# Patient Record
Sex: FEMALE | Race: WHITE | NOT HISPANIC OR LATINO | Employment: FULL TIME | URBAN - METROPOLITAN AREA
[De-identification: names, ages, dates, MRNs, and addresses within clinical notes are randomized per-mention and may not be internally consistent; named-entity substitution may affect disease eponyms.]

---

## 2023-04-03 PROBLEM — E66.01 MORBID OBESITY WITH BMI OF 45.0-49.9, ADULT (MULTI): Status: ACTIVE | Noted: 2023-04-03

## 2023-04-03 PROBLEM — E78.5 HYPERLIPIDEMIA: Status: ACTIVE | Noted: 2023-04-03

## 2023-04-03 PROBLEM — D35.2 PROLACTINOMA (MULTI): Status: ACTIVE | Noted: 2023-04-03

## 2023-04-03 PROBLEM — G47.33 OBSTRUCTIVE SLEEP APNEA OF ADULT: Status: ACTIVE | Noted: 2023-04-03

## 2023-04-03 PROBLEM — L68.0 HIRSUTISM: Status: ACTIVE | Noted: 2023-04-03

## 2023-04-03 PROBLEM — R79.89 ELEVATED PROLACTIN LEVEL: Status: ACTIVE | Noted: 2023-04-03

## 2023-04-03 PROBLEM — F52.0 HYPOACTIVE SEXUAL DESIRE DISORDER: Status: ACTIVE | Noted: 2023-04-03

## 2023-04-03 PROBLEM — Z98.84 BARIATRIC SURGERY STATUS: Status: ACTIVE | Noted: 2023-04-03

## 2023-04-03 PROBLEM — F32.1 DEPRESSION, MAJOR, SINGLE EPISODE, MODERATE (MULTI): Status: ACTIVE | Noted: 2023-04-03

## 2023-04-03 PROBLEM — E88.810 DYSMETABOLIC SYNDROME X: Status: ACTIVE | Noted: 2023-04-03

## 2023-04-03 PROBLEM — G89.29 CHRONIC PAIN OF LEFT ANKLE: Status: ACTIVE | Noted: 2023-04-03

## 2023-04-03 PROBLEM — R61 HYPERHIDROSIS: Status: ACTIVE | Noted: 2023-04-03

## 2023-04-03 PROBLEM — H52.13 BILATERAL MYOPIA: Status: ACTIVE | Noted: 2023-04-03

## 2023-04-03 PROBLEM — E28.2 POLYCYSTIC OVARIAN SYNDROME: Status: ACTIVE | Noted: 2023-04-03

## 2023-04-03 PROBLEM — T81.89XA PROBLEM INVOLVING SURGICAL INCISION: Status: ACTIVE | Noted: 2023-04-03

## 2023-04-03 PROBLEM — E22.1 HYPERPROLACTINEMIA (MULTI): Status: ACTIVE | Noted: 2023-04-03

## 2023-04-03 PROBLEM — N64.3 GALACTORRHEA: Status: ACTIVE | Noted: 2023-04-03

## 2023-04-03 PROBLEM — D50.9 IRON DEFICIENCY ANEMIA: Status: ACTIVE | Noted: 2023-04-03

## 2023-04-03 PROBLEM — M25.572 CHRONIC PAIN OF LEFT ANKLE: Status: ACTIVE | Noted: 2023-04-03

## 2023-04-03 PROBLEM — H52.203 ASTIGMATISM, BILATERAL: Status: ACTIVE | Noted: 2023-04-03

## 2023-04-03 PROBLEM — F41.9 ANXIETY: Status: ACTIVE | Noted: 2023-04-03

## 2023-04-03 PROBLEM — E23.6 PITUITARY MASS (MULTI): Status: ACTIVE | Noted: 2023-04-03

## 2023-04-03 PROBLEM — R19.00 REDUCIBLE BULGE OF ABDOMINAL WALL: Status: ACTIVE | Noted: 2023-04-03

## 2023-04-03 PROBLEM — F54 PSYCHOLOGICAL FACTOR AFFECTING PHYSICAL CONDITION: Status: ACTIVE | Noted: 2023-04-03

## 2023-04-03 RX ORDER — CITALOPRAM 20 MG/1
TABLET, FILM COATED ORAL
COMMUNITY
Start: 2019-10-02 | End: 2023-10-20 | Stop reason: SDUPTHER

## 2023-04-03 RX ORDER — BUPROPION HYDROCHLORIDE 150 MG/1
1 TABLET ORAL DAILY
COMMUNITY
Start: 2022-09-22 | End: 2023-10-20 | Stop reason: ALTCHOICE

## 2023-04-03 RX ORDER — CITALOPRAM 10 MG/1
TABLET ORAL
COMMUNITY
Start: 2021-08-18 | End: 2023-10-20 | Stop reason: SDUPTHER

## 2023-04-03 RX ORDER — CHOLECALCIFEROL (VITAMIN D3) 25 MCG
25 TABLET ORAL DAILY
COMMUNITY

## 2023-04-03 RX ORDER — MULTIVITAMIN
TABLET ORAL
COMMUNITY

## 2023-04-03 NOTE — PROGRESS NOTES
Emerald Marie is a 42 y.o. female who presents today for  c/o RIGHT SIDED BACK PAIN     Chief Complaint   Patient presents with    Follow-up    Back Pain     Emerald has c/o right sided mid back pain that is radiating around to her right side and right abdomen [id number].        Symptoms THROBBING pain on RIGHT side in the back      Symptom onset has been acute for a time period of 10 day(s).       Severity is described as moderate.       Course of her symptoms over time is worsening.    Has taken: ADVIL/TYLENOL     Pt denies fever/chills, N/V  Pt denies urinary symptoms      Review of Systems  All 13 systems were reviewed and are within normal limits except positive and pertinent negative responses which are noted below or in HPI.      Objective   Vitals:  /78   Pulse (!) 111   Wt 127 kg (280 lb)   BMI 48.06 kg/m²         Physical Exam  Vitals reviewed.   Pulmonary:      Effort: Pulmonary effort is normal.   Abdominal:      General: Abdomen is flat. Bowel sounds are normal.   Musculoskeletal:         General: Tenderness present.      Comments: RIGHT SIDED CVA TTP    Neurological:      Mental Status: She is alert.         Assessment/Plan   Problem List Items Addressed This Visit    None  Visit Diagnoses       Acute right-sided thoracic back pain    -  Primary    Relevant Orders    POCT UA Automated manually resulted

## 2023-04-04 ENCOUNTER — OFFICE VISIT (OUTPATIENT)
Dept: PRIMARY CARE | Facility: CLINIC | Age: 43
End: 2023-04-04
Payer: COMMERCIAL

## 2023-04-04 VITALS
WEIGHT: 280 LBS | HEART RATE: 111 BPM | DIASTOLIC BLOOD PRESSURE: 78 MMHG | SYSTOLIC BLOOD PRESSURE: 136 MMHG | BODY MASS INDEX: 48.06 KG/M2

## 2023-04-04 DIAGNOSIS — M54.6 ACUTE RIGHT-SIDED THORACIC BACK PAIN: Primary | ICD-10-CM

## 2023-04-04 LAB
POC APPEARANCE, URINE: CLEAR
POC BILIRUBIN, URINE: NEGATIVE
POC BLOOD, URINE: NEGATIVE
POC COLOR, URINE: YELLOW
POC GLUCOSE, URINE: NEGATIVE MG/DL
POC KETONES, URINE: NEGATIVE MG/DL
POC LEUKOCYTES, URINE: NEGATIVE
POC NITRITE,URINE: NEGATIVE
POC PH, URINE: 7 PH
POC PROTEIN, URINE: NEGATIVE MG/DL
POC SPECIFIC GRAVITY, URINE: 1.01
POC UROBILINOGEN, URINE: 0.2 EU/DL

## 2023-04-04 PROCEDURE — 81003 URINALYSIS AUTO W/O SCOPE: CPT | Performed by: NURSE PRACTITIONER

## 2023-04-04 PROCEDURE — 99213 OFFICE O/P EST LOW 20 MIN: CPT | Performed by: NURSE PRACTITIONER

## 2023-04-04 RX ORDER — NAPROXEN 500 MG/1
500 TABLET ORAL 2 TIMES DAILY PRN
Qty: 60 TABLET | Refills: 0 | Status: SHIPPED | OUTPATIENT
Start: 2023-04-04 | End: 2023-07-03

## 2023-04-04 NOTE — PATIENT INSTRUCTIONS
Thank you for seeing me today.  It was a pleasure to see you again!    #RIGHT SIDED THORACIC BACK PAIN  IO UA----> NEGATIVE  Rx Naproxen as needed   Ice 2-3 times/day for pain  Stretching     Call if symptoms worsen

## 2023-07-21 ENCOUNTER — OFFICE VISIT (OUTPATIENT)
Dept: PRIMARY CARE | Facility: CLINIC | Age: 43
End: 2023-07-21
Payer: COMMERCIAL

## 2023-07-21 ENCOUNTER — LAB (OUTPATIENT)
Dept: LAB | Facility: LAB | Age: 43
End: 2023-07-21
Payer: COMMERCIAL

## 2023-07-21 VITALS
WEIGHT: 278 LBS | HEART RATE: 92 BPM | HEIGHT: 64 IN | DIASTOLIC BLOOD PRESSURE: 79 MMHG | BODY MASS INDEX: 47.46 KG/M2 | SYSTOLIC BLOOD PRESSURE: 121 MMHG

## 2023-07-21 DIAGNOSIS — R42 DIZZINESS: Primary | ICD-10-CM

## 2023-07-21 DIAGNOSIS — R42 DIZZINESS: ICD-10-CM

## 2023-07-21 DIAGNOSIS — R73.01 ELEVATED FASTING GLUCOSE: ICD-10-CM

## 2023-07-21 DIAGNOSIS — R73.03 PREDIABETES: ICD-10-CM

## 2023-07-21 LAB
ALANINE AMINOTRANSFERASE (SGPT) (U/L) IN SER/PLAS: 55 U/L (ref 7–45)
ALBUMIN (G/DL) IN SER/PLAS: 4.5 G/DL (ref 3.4–5)
ALKALINE PHOSPHATASE (U/L) IN SER/PLAS: 55 U/L (ref 33–110)
ANION GAP IN SER/PLAS: 13 MMOL/L (ref 10–20)
ASPARTATE AMINOTRANSFERASE (SGOT) (U/L) IN SER/PLAS: 26 U/L (ref 9–39)
BILIRUBIN TOTAL (MG/DL) IN SER/PLAS: 0.4 MG/DL (ref 0–1.2)
CALCIUM (MG/DL) IN SER/PLAS: 10.1 MG/DL (ref 8.6–10.3)
CARBON DIOXIDE, TOTAL (MMOL/L) IN SER/PLAS: 29 MMOL/L (ref 21–32)
CHLORIDE (MMOL/L) IN SER/PLAS: 100 MMOL/L (ref 98–107)
CREATININE (MG/DL) IN SER/PLAS: 0.88 MG/DL (ref 0.5–1.05)
GFR FEMALE: 84 ML/MIN/1.73M2
GLUCOSE (MG/DL) IN SER/PLAS: 120 MG/DL (ref 74–99)
MAGNESIUM (MG/DL) IN SER/PLAS: 2.02 MG/DL (ref 1.6–2.4)
POC HEMOGLOBIN A1C: 5.9 % (ref 4.2–6.5)
POTASSIUM (MMOL/L) IN SER/PLAS: 4.3 MMOL/L (ref 3.5–5.3)
PROTEIN TOTAL: 7.1 G/DL (ref 6.4–8.2)
SODIUM (MMOL/L) IN SER/PLAS: 138 MMOL/L (ref 136–145)
UREA NITROGEN (MG/DL) IN SER/PLAS: 13 MG/DL (ref 6–23)

## 2023-07-21 PROCEDURE — 80053 COMPREHEN METABOLIC PANEL: CPT

## 2023-07-21 PROCEDURE — 83735 ASSAY OF MAGNESIUM: CPT

## 2023-07-21 PROCEDURE — 83036 HEMOGLOBIN GLYCOSYLATED A1C: CPT | Performed by: NURSE PRACTITIONER

## 2023-07-21 PROCEDURE — 99213 OFFICE O/P EST LOW 20 MIN: CPT | Performed by: NURSE PRACTITIONER

## 2023-07-21 PROCEDURE — 36415 COLL VENOUS BLD VENIPUNCTURE: CPT

## 2023-07-21 RX ORDER — MECLIZINE HYDROCHLORIDE 25 MG/1
25 TABLET ORAL 3 TIMES DAILY PRN
Qty: 30 TABLET | Refills: 1 | Status: SHIPPED | OUTPATIENT
Start: 2023-07-21 | End: 2023-07-21

## 2023-07-21 NOTE — PROGRESS NOTES
"Emerald Marie is a 43 y.o. female who presents today for a sick visit    Chief Complaint   Patient presents with    Follow-up     Emerald is here today for \"dizzy spells\" over the past few weeks.        Symptoms DIZZINESS    Pt states \"feels like I am spinning\"  Pt also states @ 3 weeks ago she was sitting in a chair at work and it felt like everything drained from her body  Pt states this has been an issue for her for @ 8 years  She has seen ENT and had testing for BPPV and Meniere Ds and both were negative     Pt had elevated FBG in Oct, will check A1C today    Orthostatic BP negative     Pt states her Apple watch will occasionally read HR \"40\",   Pt does not check her pulse to confirm this     Review of Systems  All 13 systems were reviewed and are within normal limits except positive and pertinent negative responses which are noted below or in HPI.      Objective   Vitals:  /79 (Patient Position: Standing)   Pulse 92   Ht 1.626 m (5' 4\")   Wt 126 kg (278 lb)   BMI 47.72 kg/m²         Physical Exam  Vitals reviewed.   Constitutional:       Appearance: She is obese.   Cardiovascular:      Pulses: Normal pulses.      Heart sounds: No murmur heard.  Pulmonary:      Effort: Pulmonary effort is normal.         Assessment/Plan   Problem List Items Addressed This Visit    None  Visit Diagnoses       Dizziness    -  Primary    Relevant Medications    meclizine (Antivert) 25 mg tablet    Other Relevant Orders    Comprehensive Metabolic Panel    Magnesium    Holter or Event Cardiac Monitor    Elevated fasting glucose        Relevant Orders    POCT glycosylated hemoglobin (Hb A1C) manually resulted (Completed)    Prediabetes                  "

## 2023-07-21 NOTE — PATIENT INSTRUCTIONS
Thank you for seeing me today.  It was a pleasure to see you again!    Your A1C was 5.9%, this is Pre-diabetes  Watch sugars/carbohydrates   Weight loss encouraged    Wear holter monitor     RTC AS NEEDED

## 2023-09-01 ENCOUNTER — TELEPHONE (OUTPATIENT)
Dept: PRIMARY CARE | Facility: CLINIC | Age: 43
End: 2023-09-01
Payer: COMMERCIAL

## 2023-09-01 NOTE — TELEPHONE ENCOUNTER
"Spoke with pt regarding holter monitor results.  Normal study   Pt states she will still feel \"dizzy\" at times, no consistency noted  Is watching sugars/carbs d/t prediabetes  No meds at this time  Has f/u in Oct to recheck A1c    "

## 2023-10-19 NOTE — PROGRESS NOTES
"Subjective   Chief Complaint   Patient presents with    Follow-up     EMERALD IS HERE TODAY FOR ROUTINE 3 MONTH F/U AND A1C CHECK. PT WOULD LIKE TO GET HER FLU SHOT TODAY ALSO.        Patient ID: Emerald Marie is a 43 y.o. female who presents for Follow-up (EMERALD IS HERE TODAY FOR ROUTINE 3 MONTH F/U AND A1C CHECK. PT WOULD LIKE TO GET HER FLU SHOT TODAY ALSO. ).    HPI  Est 42 yo female presents for 3 mo f/u on PREDIABETES     Pt reports still experiencing intermittent dizziness  Last occurrence was 2 weeks ago while cleaning the house  Denies syncope  Denies headaches  Pt has done holter monitor---> NEGATIVE  Pt has seen ENT---> NEGATIVE W/U     A1C= 5.9% July 2023  No medications    Review of Systems  All 13 systems were reviewed and are within normal limits except positive and pertinent negative responses which are noted below or in HPI.      Objective   /80   Pulse 90   Ht 1.626 m (5' 4\")   Wt 125 kg (276 lb)   BMI 47.38 kg/m²        Physical Exam  Vitals reviewed.   Cardiovascular:      Pulses: Normal pulses.      Heart sounds: Normal heart sounds.   Pulmonary:      Effort: Pulmonary effort is normal.      Breath sounds: Normal breath sounds.   Skin:     General: Skin is warm and dry.   Neurological:      Mental Status: She is alert.   Psychiatric:         Mood and Affect: Mood normal.         Assessment/Plan   Problem List Items Addressed This Visit             ICD-10-CM    Depression, major, single episode, moderate (CMS/HCC) F32.1    Relevant Medications    citalopram (CeleXA) 10 mg tablet    citalopram (CeleXA) 20 mg tablet    Hyperlipidemia E78.5    Relevant Orders    Lipid Panel    Hyperprolactinemia (CMS/HCC) E22.1    Iron deficiency anemia D50.9    Relevant Orders    CBC    Vitamin D 25-Hydroxy,Total (for eval of Vitamin D levels)    Morbid obesity with BMI of 45.0-49.9, adult (CMS/HCC) E66.01, Z68.42    Relevant Orders    Comprehensive Metabolic Panel     Other Visit Diagnoses         " Codes    Breast screening    -  Primary Z12.39    Relevant Orders    BI mammo bilateral screening tomosynthesis    Prolactin secreting pituitary adenoma (CMS/HCC)     D35.2    Relevant Orders    Prolactin level    Dizziness     R42    Relevant Orders    Referral to Neurology    Encounter for immunization     Z23    Relevant Orders    Flu vaccine (IIV4) age 6 months and greater, preservative free (Completed)    Need for vaccination     Z23    Prediabetes     R73.03    Relevant Orders    POCT glycosylated hemoglobin (Hb A1C) manually resulted (Completed)

## 2023-10-20 ENCOUNTER — OFFICE VISIT (OUTPATIENT)
Dept: PRIMARY CARE | Facility: CLINIC | Age: 43
End: 2023-10-20
Payer: COMMERCIAL

## 2023-10-20 ENCOUNTER — PHARMACY VISIT (OUTPATIENT)
Dept: PHARMACY | Facility: CLINIC | Age: 43
End: 2023-10-20
Payer: COMMERCIAL

## 2023-10-20 VITALS
WEIGHT: 276 LBS | BODY MASS INDEX: 47.12 KG/M2 | SYSTOLIC BLOOD PRESSURE: 136 MMHG | DIASTOLIC BLOOD PRESSURE: 80 MMHG | HEIGHT: 64 IN | HEART RATE: 90 BPM

## 2023-10-20 DIAGNOSIS — Z12.39 BREAST SCREENING: Primary | ICD-10-CM

## 2023-10-20 DIAGNOSIS — R42 DIZZINESS: ICD-10-CM

## 2023-10-20 DIAGNOSIS — E66.01 MORBID OBESITY WITH BMI OF 45.0-49.9, ADULT (MULTI): ICD-10-CM

## 2023-10-20 DIAGNOSIS — E22.1 HYPERPROLACTINEMIA (MULTI): ICD-10-CM

## 2023-10-20 DIAGNOSIS — D50.8 IRON DEFICIENCY ANEMIA SECONDARY TO INADEQUATE DIETARY IRON INTAKE: ICD-10-CM

## 2023-10-20 DIAGNOSIS — R73.03 PREDIABETES: ICD-10-CM

## 2023-10-20 DIAGNOSIS — Z23 NEED FOR VACCINATION: ICD-10-CM

## 2023-10-20 DIAGNOSIS — F32.1 DEPRESSION, MAJOR, SINGLE EPISODE, MODERATE (MULTI): ICD-10-CM

## 2023-10-20 DIAGNOSIS — E78.2 MIXED HYPERLIPIDEMIA: ICD-10-CM

## 2023-10-20 DIAGNOSIS — Z23 ENCOUNTER FOR IMMUNIZATION: ICD-10-CM

## 2023-10-20 DIAGNOSIS — D35.2 PROLACTIN SECRETING PITUITARY ADENOMA (MULTI): ICD-10-CM

## 2023-10-20 PROBLEM — L21.9 SEBORRHEIC DERMATITIS, UNSPECIFIED: Status: ACTIVE | Noted: 2021-08-06

## 2023-10-20 LAB — POC HEMOGLOBIN A1C: 5.9 % (ref 4.2–6.5)

## 2023-10-20 PROCEDURE — 90471 IMMUNIZATION ADMIN: CPT | Performed by: NURSE PRACTITIONER

## 2023-10-20 PROCEDURE — 90686 IIV4 VACC NO PRSV 0.5 ML IM: CPT | Performed by: NURSE PRACTITIONER

## 2023-10-20 PROCEDURE — 3008F BODY MASS INDEX DOCD: CPT | Performed by: NURSE PRACTITIONER

## 2023-10-20 PROCEDURE — 83036 HEMOGLOBIN GLYCOSYLATED A1C: CPT | Performed by: NURSE PRACTITIONER

## 2023-10-20 PROCEDURE — 99214 OFFICE O/P EST MOD 30 MIN: CPT | Performed by: NURSE PRACTITIONER

## 2023-10-20 RX ORDER — CITALOPRAM 10 MG/1
TABLET ORAL
Qty: 90 TABLET | Refills: 3 | Status: SHIPPED | OUTPATIENT
Start: 2023-10-20 | End: 2024-10-19

## 2023-10-20 RX ORDER — CITALOPRAM 20 MG/1
TABLET, FILM COATED ORAL
Qty: 90 TABLET | Refills: 3 | Status: SHIPPED | OUTPATIENT
Start: 2023-10-20 | End: 2024-10-19

## 2023-10-21 ENCOUNTER — LAB (OUTPATIENT)
Dept: LAB | Facility: LAB | Age: 43
End: 2023-10-21
Payer: COMMERCIAL

## 2023-10-21 DIAGNOSIS — E66.01 MORBID OBESITY WITH BMI OF 45.0-49.9, ADULT (MULTI): ICD-10-CM

## 2023-10-21 DIAGNOSIS — D50.8 IRON DEFICIENCY ANEMIA SECONDARY TO INADEQUATE DIETARY IRON INTAKE: ICD-10-CM

## 2023-10-21 DIAGNOSIS — D35.2 PROLACTIN SECRETING PITUITARY ADENOMA (MULTI): ICD-10-CM

## 2023-10-21 DIAGNOSIS — E78.2 MIXED HYPERLIPIDEMIA: ICD-10-CM

## 2023-10-21 LAB
25(OH)D3 SERPL-MCNC: 33 NG/ML (ref 30–100)
ALBUMIN SERPL BCP-MCNC: 4.4 G/DL (ref 3.4–5)
ALP SERPL-CCNC: 52 U/L (ref 33–110)
ALT SERPL W P-5'-P-CCNC: 29 U/L (ref 7–45)
ANION GAP SERPL CALC-SCNC: 13 MMOL/L (ref 10–20)
AST SERPL W P-5'-P-CCNC: 14 U/L (ref 9–39)
BILIRUB SERPL-MCNC: 0.5 MG/DL (ref 0–1.2)
BUN SERPL-MCNC: 13 MG/DL (ref 6–23)
CALCIUM SERPL-MCNC: 9.6 MG/DL (ref 8.6–10.6)
CHLORIDE SERPL-SCNC: 103 MMOL/L (ref 98–107)
CHOLEST SERPL-MCNC: 182 MG/DL (ref 0–199)
CHOLESTEROL/HDL RATIO: 4.7
CO2 SERPL-SCNC: 29 MMOL/L (ref 21–32)
CREAT SERPL-MCNC: 0.8 MG/DL (ref 0.5–1.05)
ERYTHROCYTE [DISTWIDTH] IN BLOOD BY AUTOMATED COUNT: 13.4 % (ref 11.5–14.5)
GFR SERPL CREATININE-BSD FRML MDRD: >90 ML/MIN/1.73M*2
GLUCOSE SERPL-MCNC: 103 MG/DL (ref 74–99)
HCT VFR BLD AUTO: 35.1 % (ref 36–46)
HDLC SERPL-MCNC: 39 MG/DL
HGB BLD-MCNC: 10.8 G/DL (ref 12–16)
LDLC SERPL CALC-MCNC: 106 MG/DL
MCH RBC QN AUTO: 26.4 PG (ref 26–34)
MCHC RBC AUTO-ENTMCNC: 30.8 G/DL (ref 32–36)
MCV RBC AUTO: 86 FL (ref 80–100)
NON HDL CHOLESTEROL: 143 MG/DL (ref 0–149)
NRBC BLD-RTO: 0 /100 WBCS (ref 0–0)
PLATELET # BLD AUTO: 285 X10*3/UL (ref 150–450)
PMV BLD AUTO: 10.8 FL (ref 7.5–11.5)
POTASSIUM SERPL-SCNC: 4.9 MMOL/L (ref 3.5–5.3)
PROLACTIN SERPL-MCNC: 20 UG/L (ref 3–20)
PROT SERPL-MCNC: 6.6 G/DL (ref 6.4–8.2)
RBC # BLD AUTO: 4.09 X10*6/UL (ref 4–5.2)
SODIUM SERPL-SCNC: 140 MMOL/L (ref 136–145)
TRIGL SERPL-MCNC: 183 MG/DL (ref 0–149)
VLDL: 37 MG/DL (ref 0–40)
WBC # BLD AUTO: 6.6 X10*3/UL (ref 4.4–11.3)

## 2023-10-21 PROCEDURE — 82306 VITAMIN D 25 HYDROXY: CPT

## 2023-10-21 PROCEDURE — 85027 COMPLETE CBC AUTOMATED: CPT

## 2023-10-21 PROCEDURE — 80053 COMPREHEN METABOLIC PANEL: CPT

## 2023-10-21 PROCEDURE — 80061 LIPID PANEL: CPT

## 2023-10-21 PROCEDURE — 84146 ASSAY OF PROLACTIN: CPT

## 2023-10-21 PROCEDURE — 36415 COLL VENOUS BLD VENIPUNCTURE: CPT

## 2023-10-27 ENCOUNTER — ANCILLARY PROCEDURE (OUTPATIENT)
Dept: RADIOLOGY | Facility: CLINIC | Age: 43
End: 2023-10-27
Payer: COMMERCIAL

## 2023-10-27 VITALS — HEIGHT: 64 IN | WEIGHT: 278 LBS | BODY MASS INDEX: 47.46 KG/M2

## 2023-10-27 DIAGNOSIS — Z12.39 BREAST SCREENING: ICD-10-CM

## 2023-10-27 PROCEDURE — 77067 SCR MAMMO BI INCL CAD: CPT

## 2023-11-07 ENCOUNTER — APPOINTMENT (OUTPATIENT)
Dept: PRIMARY CARE | Facility: CLINIC | Age: 43
End: 2023-11-07
Payer: COMMERCIAL

## 2023-11-07 PROCEDURE — 87086 URINE CULTURE/COLONY COUNT: CPT

## 2023-11-07 PROCEDURE — 87186 SC STD MICRODIL/AGAR DIL: CPT

## 2023-11-08 ENCOUNTER — LAB REQUISITION (OUTPATIENT)
Dept: LAB | Facility: HOSPITAL | Age: 43
End: 2023-11-08
Payer: COMMERCIAL

## 2023-11-08 DIAGNOSIS — N39.0 URINARY TRACT INFECTION, SITE NOT SPECIFIED: ICD-10-CM

## 2023-11-10 LAB — BACTERIA UR CULT: ABNORMAL

## 2024-01-04 ENCOUNTER — OFFICE VISIT (OUTPATIENT)
Dept: NEUROLOGY | Facility: CLINIC | Age: 44
End: 2024-01-04
Payer: COMMERCIAL

## 2024-01-04 VITALS
WEIGHT: 282 LBS | SYSTOLIC BLOOD PRESSURE: 134 MMHG | BODY MASS INDEX: 48.41 KG/M2 | DIASTOLIC BLOOD PRESSURE: 74 MMHG | HEART RATE: 86 BPM | RESPIRATION RATE: 19 BRPM

## 2024-01-04 DIAGNOSIS — G43.809 VESTIBULAR MIGRAINE: Primary | ICD-10-CM

## 2024-01-04 DIAGNOSIS — R42 DIZZINESS: ICD-10-CM

## 2024-01-04 PROCEDURE — 1036F TOBACCO NON-USER: CPT | Performed by: PSYCHIATRY & NEUROLOGY

## 2024-01-04 PROCEDURE — 99204 OFFICE O/P NEW MOD 45 MIN: CPT | Performed by: PSYCHIATRY & NEUROLOGY

## 2024-01-04 PROCEDURE — 99214 OFFICE O/P EST MOD 30 MIN: CPT | Performed by: PSYCHIATRY & NEUROLOGY

## 2024-01-04 PROCEDURE — RXMED WILLOW AMBULATORY MEDICATION CHARGE

## 2024-01-04 PROCEDURE — 3008F BODY MASS INDEX DOCD: CPT | Performed by: PSYCHIATRY & NEUROLOGY

## 2024-01-04 RX ORDER — TOPIRAMATE 25 MG/1
TABLET ORAL
Qty: 120 TABLET | Refills: 1 | Status: SHIPPED | OUTPATIENT
Start: 2024-01-04 | End: 2024-04-04 | Stop reason: SDUPTHER

## 2024-01-04 ASSESSMENT — PAIN SCALES - GENERAL: PAINLEVEL: 0-NO PAIN

## 2024-01-04 NOTE — PROGRESS NOTES
HPI:   Consultation on this 43 year old woman was requested regarding dizziness.  The patient was alone.    Previous records (physician notes, land radiology reports) and imaging studies were reviewed and summarized.      Had brain MRI in 2020: improvement in size of known prolactinoma  For the prolactinoma and she follows with PCP and gets regular labs, used to follow with endocrine. Not on therapy as blood work is stable.  Here b/o 10 years of intemittent dizziness, 1-2 times a month, can last for 2-3 days, things are not moving but feels like a sense of spinning forward with eyes closed. With eyes open there is more a feeling of visual discomfort along with some trouble with balance but not falls. During most of these episodes she has to stay in bed, sometimes gets nausea, sometimes with photophobia. No major headaches, last one 2 weeks ago with pain that started behind the left eye then involved the whole forehead, usually feels like pressure tension 4/10 at the most, will take tyelnol which helps, can last for a few hours.   Denies syncope.  Pt has had holter monitor and ENT evaluation in the past, which were unrevealing.       The patient denies cognitive symptoms (e.g., trouble with memory, concentration, thinking speed, multitasking), mood changes (e.g., anxiety, depression), diplopia, bulbar symptoms, weakness, spasticity, sensory loss, dysesthesias, paresthesias, incoordination, difficulty with gait, bladder and bowel symptoms.     10-point review of systems was negative except as mentioned in the HPI and/or in the scanned Department of Neurology health assessment form.     FMH:  Grandmother has vertigo    Past Medical History:   Diagnosis Date    Allergy status to unspecified drugs, medicaments and biological substances     History of seasonal allergies    Anemia     Depression     Encounter for gynecological examination (general) (routine) without abnormal findings 09/08/2020    Encounter for annual  routine gynecological examination    Encounter for gynecological examination (general) (routine) without abnormal findings 10/15/2019    Encounter for annual routine gynecological examination    Encounter for gynecological examination (general) (routine) without abnormal findings 04/26/2016    Encounter for annual routine gynecological examination    Encounter for other general counseling and advice on contraception 03/27/2017    Counseling for birth control, oral contraceptives    Encounter for other preprocedural examination 09/03/2020    Preoperative clearance    Encounter for other specified surgical aftercare 02/22/2021    Postoperative observation    Encounter for screening for nutritional disorder     Encounter for vitamin deficiency screening    Encounter for screening for nutritional disorder 08/19/2020    Encounter for special screening examination for nutritional disorder    Infection following a procedure, superficial incisional surgical site, initial encounter 01/25/2021    Superficial incisional infection of surgical site    Obesity, unspecified 09/03/2020    Obesity (BMI 30-39.9)    Other prurigo 04/23/2021    Pruritic rash    Other specified disorders of nose and nasal sinuses 03/06/2020    Sinus pressure    Personal history of diseases of the blood and blood-forming organs and certain disorders involving the immune mechanism     History of anemia    Personal history of diseases of the skin and subcutaneous tissue 12/04/2018    History of pityriasis rosea    Personal history of other benign neoplasm 10/13/2020    History of uterine leiomyoma    Personal history of other diseases of the female genital tract 11/13/2020    History of abnormal uterine bleeding    Personal history of other diseases of the female genital tract 02/05/2020    History of polycystic ovarian syndrome    Personal history of other diseases of the respiratory system 02/21/2015    History of acute sinusitis    Personal history of  other specified conditions 06/10/2020    History of dizziness    Personal history of other specified conditions 2013    History of nipple discharge    Persons encountering health services in other specified circumstances 2020    Encounter to establish care    Polyp of cervix uteri 2020    Cervical polyp    Radiculopathy, lumbar region 2016    Acute lumbar radiculopathy    Right lower quadrant pain 10/15/2019    Abdominal pain, RLQ (right lower quadrant)    Snoring     Snoring      Allergies   Allergen Reactions    Azithromycin Rash    Clarithromycin Rash      Social History     Tobacco Use    Smoking status: Former     Packs/day: 1.00     Years: 15.00     Additional pack years: 0.00     Total pack years: 15.00     Types: Cigarettes     Quit date: 3/20/2023     Years since quittin.7     Passive exposure: Past    Smokeless tobacco: Never   Vaping Use    Vaping Use: Never used   Substance Use Topics    Alcohol use: Yes     Comment: 2-3 drinks a month    Drug use: Never        Current Outpatient Medications:     cholecalciferol (Vitamin D-3) 25 MCG (1000 UT) tablet, Take 1 tablet (25 mcg) by mouth once daily., Disp: , Rfl:     citalopram (CeleXA) 10 mg tablet, TAKE 1 TABLET BY MOUTH ONE TIME DAILY WITH CITALOPRAM 20MG. TOTAL DOSE OF 30MG., Disp: 90 tablet, Rfl: 3    citalopram (CeleXA) 20 mg tablet, TAKE 1 TABLET BY MOUTH ONE TIME DAILY WITH CITALOPRAM 10MG TABLET. TOTAL DOSE OF 30MG., Disp: 90 tablet, Rfl: 3    ferrous sulfate (IRON ORAL), , Disp: , Rfl:     MAGNESIUM ORAL, , Disp: , Rfl:     meclizine (Antivert) 25 mg tablet, TAKE 1 TABLET BY MOUTH THREE TIMES A DAY AS NEEDED FOR DIZZINESS, Disp: 30 tablet, Rfl: 1    multivitamin tablet, Multi Vitamin Daily TABS  Refills: 0     Active, Disp: , Rfl:     ZINC ORAL, , Disp: , Rfl:      Exam:  Normal general appearance.     The patient was alert and oriented to person, place, and time with normal language, attention and concentration, recent  and remote memory. Normal fund of knowledge.     Visual fields were full to confrontation.  Pupils were 6 mm and briskly reactive OU without a relative afferent pupillary defect.  Funduscopic examination was grossly normal without disc edema, erythema, or atrophy.  Eye movements: normal fixation, no spontaneous or gaze-evoked nystagmus, normal speed and amplitude of horizontal and vertical saccades, no saccadic dysmetria, normal smooth pursuit  Facial sensation to light touch and pinprick was normal.  Muscles of mastication and facial expression moved normally.  Hearing was normal.  Gag reflex and palatal movements were normal.  Sternocleidomastoid and trapezius power were normal.  Tongue movements were normal.  There was no dysarthria.    There was no pronator drift, no orbiting. Normal bulk and tone.    Muscle Strenght (#/5):  Upper extremity    Deltoids Right 5 Left 5  Biceps Right 5 Left 5  Triceps Right 5 Left 5   Right 5 Left 5  ROSSANA Right 5 Left 5  Lower extremity    Iliopsoas Right 5 Left 5  Quadriceps Right 5 Left 5  Hamstrings Right 5 Left 5  Tibialis ant Right 5 Left 5  Gastrocn Right 5 Left 5    Reflexes:  Upper extremity    Biceps Right 2+ Left 2+  Triceps Right 2+ Left 2+  Brachiorad Right 2+ Left 2+  Lower extremity    Patellar Right 1+ Left 2+  Achilles Right 2+ Left 2+  Plant Cut Right Down Left Down    Coordination in the arms and legs was intact including point-to-point, rapid-alternating, and fine movements.    Light touch, pinprick, and proprioception were normal.  Romberg's test was normal.    Standard gait was normal.      A/P:  42 yo woman with hx of prolactinoma, not on therapy, here for a decade of intermittent episodes of dizziness, that even without major episodes of headaches, do even some features suggestive of migraines. She had work up to r/o other etiologies. I think it is reasonable to empirically try topiramate, whose possible SE I have discussed. She has my contact info, will  see her back in 3 months.

## 2024-01-05 ENCOUNTER — PHARMACY VISIT (OUTPATIENT)
Dept: PHARMACY | Facility: CLINIC | Age: 44
End: 2024-01-05
Payer: COMMERCIAL

## 2024-01-23 ENCOUNTER — TELEMEDICINE (OUTPATIENT)
Dept: BEHAVIORAL HEALTH | Facility: CLINIC | Age: 44
End: 2024-01-23
Payer: COMMERCIAL

## 2024-01-23 DIAGNOSIS — F54 PSYCHOLOGICAL FACTOR AFFECTING PHYSICAL CONDITION: ICD-10-CM

## 2024-01-23 DIAGNOSIS — F33.0 MILD EPISODE OF RECURRENT MAJOR DEPRESSIVE DISORDER (CMS-HCC): Primary | ICD-10-CM

## 2024-01-23 PROCEDURE — 90791 PSYCH DIAGNOSTIC EVALUATION: CPT | Performed by: PSYCHOLOGIST

## 2024-01-23 NOTE — PROGRESS NOTES
Outpatient Psychology Initial Evaluation    IDENTIFYING AND REFERRAL INFORMATION:  Emerald Marie is a 43 y.o. yo able-bodied  heterosexual White female patient referred by her PCP, GAIL Johnson CNP.     Start Time: 4 pm  End Time: 5 pm  Time Spent with Patient: 52 minutes    Session number 1 with this psychologist.    This is a virtual video visit.    Telephone/Televideo Informed Consent for Psychotherapy was reviewed with the patient as follows:  There are potential benefits and risks of the use of telephone or video-conferencing that differ from in-person sessions. Specifically, the telephone or televideo system we are using may not be HIPPA compliant and may present limits to patient confidentiality. Confidentiality still applies for telepsychology services, and nobody will record the session without your permission. You agree to use the telephone or video-conferencing platform selected for our virtual sessions, and I will explain how to use it.    1)             You need to use a webcam or smartphone during the session.  2)             It is important that you be in a quiet, private space that is free of distractions (including cell phone or other devices) during the session.  3)             It is important to use a secure internet connection rather than public/free Wi-Fi.  4)             It is important to be on time. If you need to cancel or change your tele-appointment, you must notify the psychologist in advance by phone or email.  5)             We need a back-up plan (e.g., phone number where you can be reached) to restart the session or to reschedule it, in the event of technical problems.  6)             We need a safety plan that includes at least one emergency contact and the closest emergency room to your location, in the event of a crisis situation.  7)             If you are not an adult, we need the permission of your parent or legal guardian (and their contact information) for you to  "participate in telepsychology sessions.    Understanding and verbal agreement was attested to by the patient.    The purpose of the meeting is for provider to understand patient's presenting problems, mental health hx, and other background information to assist with treatment planning. Patient stated an understanding of the information and agreed to the interview.    SECURE NOTE:  This document may not be released or reproduced in any form without the consent of either the Provider, the Provider´s  or the Chair of the Department of Psychiatry. This prohibition includes copying the document into any non-Restricted area of the Ambulatory Electronic Medical Record.      REASON  FOR REFERRAL:  Recent symptoms of depression, anxiety, history of suicidal thoughts (no plan or intent per patient). The holiday season she noticed more symptoms, because of end of year stressors not specific to the holidays. A lot of work demands, fight with . Responded with self-isolation and increased suicidal ideation.     SOCIAL AND FAMILY HISTORY:   almost 10 years. No children, intentional. Two sisters who are supports. Parents are  and still living, she is close with them as well. They all live within 10 minutes of each other. She has nieces and nephews and one great nephew. She is close with them, they do family activities and invites nieces/nephews over to her house. Her family moved to [place] when she was little, all other extended family lives out of state. \"We're it here.\" Couple of close friends, best friend lives out of state. Brother in law, small group of couples friends, a few girlfriends. Get together occasionally.   She and her  have a boat and kayaks, like to be out on the water. Enjoys beach vacations, seeing lighthouses around the country. Gambling, reading are also enjoyment.      employee,  and billing. Work has been busier since transition to " "[employer].    CULTURAL CONSIDERATIONS:  Congregational, no [employer] attendance. Believes in God. Moved to [place] as a kid, immediate family all lives within 10 minute drive of each other. Childless by choice.     HISTORY OF MENTAL HEALTH SYMPTOMS AND TREATMENT:  When felt most overwhelmed, was having trouble concentrating, experiencing depressed mood, hopelessness, some suicidal ideation with plan (not intent), anhedonia, fatigue, rumination, insomnia (difficulty falling asleep). Increased Celexa dose and symptoms improved. SI stopped, mood improved to depressed mood half the time, insomnia still going, anhedonia and energy improved, but more to go. \"It's getting there.\"     Has never sought mental health treatment, other than PCP prescription. No history of psych hospitalization.     SUBSTANCE USE:   Alcohol use occasional, couple times a month. A couple drinks when out.   Marijuana edibles on weekends.   No tobacco use, no illicit drug use.     RELEVANT MED Hx AND MEDICATIONS:  Taking Celexa 30 mg, prescribed by PCP. Almost 2 years on the medication.  PCOS, makes weight management challenging. Chronic dizziness/vestibular issues. On a new medication to see if it will help.     OBJECTIVE:  Mental Status:  Orientation and consciousness: [x ]oriented X 3 and attentive                                   [ ]other, explain:       Appearance/grooming (factors observable via video):            [x ]appropriate  [ ]poor grooming/hygiene bizarre appearance            [ ]other, explain:        Behavior: [x ]appropriate to context                 [ ]inappropriate and/or bizarre, explain:        Speech: [x]normal rate/rhythm   [ ]pressured speech   []slow                [ ]other,        Language: [x ]normal                  [ ]abnormalities noted, explain:        Mood: [ ]euthymic [ x]depressed [ ]dysphoric [ ]anxious      [ ]euphoric  [ ] other       Affect: [x]mood congruent      []restricted               []incongruent, explain:        " Evidence of perceptual disturbances (hallucinations, illusions, etc.):                [ x]no                [ ]yes, explain:       Thought processes:                     [x ]normal and congruent                     [ ]other, explain:        Insight: [ ]good  [x ]adequate [ ]poor []questionable       Judgment: [ ]good  [x ]adequate [ ]poor []questionable       Fund of Knowledge:[ ]above average  [x ]average  [ ]below average    Suicidal/Homicidal assessment: No lethality issues noted or observed. Patient denied suicidal or homicidal ideation, intent, or plan. She endorsed recent suicidal ideation with consideration of plan, no specific plan, no preparatory behaviors, and not intent to harm. This was 3-4 weeks ago. She denied any recurrence since then and reported symptoms have improved.   A. Risk Factors:   YES  NO   [  x] [  ] Significant psychosocial stressors   [  ] [  x] Current thoughts of suicide or self-harm; homicide  [  ] [  x] Previous suicide attempts   [  ] [ x ] Substance abuse  [  x] [  ] Sleep disturbance   [  ] [  x] Access to weapons- they own firearms.  moved them to a location she does not know. They are secured.    B. Protective Factors:  YES  NO   [ x ] [  ] Has social supports  [ x ] [  ] Life satisfaction/stated reasons for living  [ x ] [  ] Taking medications as prescribed   [ x ] [  ] Positive coping/problem solving skills: Positive help-seeking  [ x ] [  ] Sense of responsibility/attachment to others    C. Overall Impression:  RISK LEVEL   [ x ]   Low imminent and long-term risk of harm to others. Low imminent risk of harm to self at this time. Risk is dynamic and can change.   [  ]   Moderate long-term risk of harm to self given history of XXX in   combination with other risk factors.  [  ]   High      SOCIAL DETERMINANTS OF HEALTH:  None noted.      INITIAL IMPRESSIONS:  Emerald Marie presented today via video telehealth for a psychosocial assessment. She reported recurrent  depressive symptoms exacerbated by situational stressors, for which she is taking Celexa prescribed by her PCP. She endorsed current symptoms of depressed mood about half the days, anhedonia, insomnia, periodic hopelessness, rumination, self-blame, self-isolative behaviors. She endorsed past suicidal ideation about a month ago, without intent. This was in context of daily depressed mood, hopelessness, and anhedonia which has since improved to the current reported symptom severity and frequency. She has never sought psychotherapy before. She feels this is a good timing, considering her chronic stressors and coping resources that are no longer enough for her needs. She agreed to follow-up with this psychologist for individual psychotherapy and requested telehealth modality.      Diagnosis: major depressive disorder, recurrent, mild, current episode    Plan:   rtc in about 2 weeks for individual psychotherapy with this psychologist via video telehealth. Patient agreed to appointment frequency and plan. Patient has scheduling contact info to use as needed. Order placed and scheduling provider alerted.     Continue Celexa as prescribed; report concerns to prescribing provider if they occur.       Alexis Huff, PhD

## 2024-02-09 ENCOUNTER — PHARMACY VISIT (OUTPATIENT)
Dept: PHARMACY | Facility: CLINIC | Age: 44
End: 2024-02-09
Payer: COMMERCIAL

## 2024-02-09 PROCEDURE — RXMED WILLOW AMBULATORY MEDICATION CHARGE

## 2024-02-13 ENCOUNTER — TELEMEDICINE (OUTPATIENT)
Dept: BEHAVIORAL HEALTH | Facility: CLINIC | Age: 44
End: 2024-02-13
Payer: COMMERCIAL

## 2024-02-13 DIAGNOSIS — F33.0 MILD EPISODE OF RECURRENT MAJOR DEPRESSIVE DISORDER (CMS-HCC): ICD-10-CM

## 2024-02-13 PROCEDURE — 90837 PSYTX W PT 60 MINUTES: CPT | Performed by: PSYCHOLOGIST

## 2024-02-13 NOTE — PROGRESS NOTES
Behavioral Medicine Psychotherapy Progress Note      IDENTIFYING AND REFERRAL INFORMATION:  Emerald Marie is a 43 y.o. able-bodied  employed White female patient who presented for follow-up.     Start Time: 4 pm  End Time: 5 pm  Time Spent with Patient: 54 minutes    Session number 2 with this psychologist.    This is a virtual video visit.    Telephone/Televideo Informed Consent for Psychotherapy was reviewed with the patient as follows:  There are potential benefits and risks of the use of telephone or video-conferencing that differ from in-person sessions. Specifically, the telephone or televideo system we are using may not be HIPPA compliant and may present limits to patient confidentiality. Confidentiality still applies for telepsychology services, and nobody will record the session without your permission. You agree to use the telephone or video-conferencing platform selected for our virtual sessions, and I will explain how to use it.    1)             You need to use a webcam or smartphone during the session.  2)             It is important that you be in a quiet, private space that is free of distractions (including cell phone or other devices) during the session.  3)             It is important to use a secure internet connection rather than public/free Wi-Fi.  4)             It is important to be on time. If you need to cancel or change your tele-appointment, you must notify the psychologist in advance by phone or email.  5)             We need a back-up plan (e.g., phone number where you can be reached) to restart the session or to reschedule it, in the event of technical problems.  6)             We need a safety plan that includes at least one emergency contact and the closest emergency room to your location, in the event of a crisis situation.  7)             If you are not an adult, we need the permission of your parent or legal guardian (and their contact information) for you to  participate in telepsychology sessions.    Understanding and verbal agreement was attested to by the patient.    The purpose of the meeting was reviewed as indicated, and limits of confidentiality reviewed as needed. Patient stated an understanding of the information and agreed to the session.       Symptoms: low mood half the time, insomnia, some anhedonia    Focus of treatment: skills building, changing unhelpful responses, symptom reduction, learning about relapse cues    Session Content:   Feeling generally ok, noticing small improvements still. Still more sedentary and less active than is her baseline, which has slowly been declining for the last few years. She tends to go outside and hike or go to the beach with her  and he has long term recovery from an ankle injury that's reinforced getting out of the habit, along with buying a house.     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 She wants to show up for her team and feels like she needs to take on work to help out. Unsure how to pull back; has identified one strategy with her co-supervisor, to give team members some responsibility by divvying up who attends leadership meetings. She was interested in clarifying boundaries at work and understanding which are flexible and which are rigid, as well as if any have changed since taking her new job.     Additional historical information:   First time seeking psychotherapy. History of depression; has taken medication from PCP before and is currently. Small support system, close with immediate family. Promotion at work and having difficulty coping with work demands/setting appropriate limits. Self-described people pleaser. Finds challenges with weight management and chronic vestibular issues to be stressors and to negatively impact her mood.     Current Psychiatric Medications:  Celexa 30 mg, from PCP for last 2 years.     Objective:  Mental  Status  Orientation and consciousness: [x ]oriented X 3 and attentive     [ ]other, explain:   Appearance/grooming :    [x ]appropriate [ ]poor grooming/hygiene bizarre appearance    [ ]other, explain:    Behavior: [x ]appropriate to context.   [ ]inappropriate and/or bizarre, explain:    Speech: [x]normal rate/rhythm [ ]pressured speech []slow    [ ]other,    Language: [x ]normal    [ ]abnormalities noted, explain:    Mood: [ ]euthymic [ x]depressed [ ]dysphoric [ ]anxious    [ ]euphoric [ ] other   Affect: [x]mood congruent    []incongruent, explain:    Evidence of perceptual disturbances (hallucinations, illusions, etc.):    [ x]no    [ ]yes, explain:   Thought processes:     [x ]normal and congruent     [ ]other, explain:    Insight: [ ]good [x ]adequate [ ]poor []questionable   Judgment: [ ]good [x ]adequate [ ]poor []questionable   Fund of Knowledge:[ ]above average [x ]average [ ]below average    Risk Assessment  Homicidal intentions: no  Homicidal plan: no  Suicidal intentions: no  Suicidal plan: no  Risk Factors:  Protective Factors:       Therapeutic Intervention:   [ x] Psychosocial  [ ] Treatment Goals  [ x] Cognitive/Behavioral- interventions appropriate to exploring identifying personal boundaries and engaging in limit setting with herself. Explored different areas of our life where we can have boundaries, compared flexible and rigid boundaries. Discussed coping mechanisms she has and role of people pleasing in her decisions.    [ x] Psychoeducation   [ ] Insight/Psychodynamic  [ ] Problem solving  [ x] Supportive  [ ] Referral to additional/other treatment/resources      Diagnosis: MDD, recurrent, mild, current episode    Treatment Plan/Recommendations:   rtc in about 3 weeks for individual psychotherapy with this psychologist via video telehealth. Patient agreed to appointment frequency and plan. Patient has scheduling contact info to use as needed. Upcoming appointment already scheduled.        Prognosis: good    Progress to date: some, invested and motivated. Progress as noted.       Alexis Huff, PhD

## 2024-03-05 ENCOUNTER — TELEMEDICINE (OUTPATIENT)
Dept: BEHAVIORAL HEALTH | Facility: CLINIC | Age: 44
End: 2024-03-05
Payer: COMMERCIAL

## 2024-03-05 DIAGNOSIS — F54 PSYCHOLOGICAL FACTOR AFFECTING PHYSICAL CONDITION: ICD-10-CM

## 2024-03-05 DIAGNOSIS — F33.0 MILD EPISODE OF RECURRENT MAJOR DEPRESSIVE DISORDER (CMS-HCC): Primary | ICD-10-CM

## 2024-03-05 PROCEDURE — 90837 PSYTX W PT 60 MINUTES: CPT | Performed by: PSYCHOLOGIST

## 2024-03-05 NOTE — PROGRESS NOTES
Behavioral Medicine Psychotherapy Progress Note      IDENTIFYING AND REFERRAL INFORMATION:  Emerald Marie is a 43 y.o. able-bodied  employed White female patient who presented for follow-up.     Start Time: 4 pm  End Time: 5 pm  Time Spent with Patient: 54 minutes    Session number 3 with this psychologist.    This is a virtual video visit.    Telephone/Televideo Informed Consent for Psychotherapy was reviewed with the patient as follows:  There are potential benefits and risks of the use of telephone or video-conferencing that differ from in-person sessions. Specifically, the telephone or televideo system we are using may not be HIPPA compliant and may present limits to patient confidentiality. Confidentiality still applies for telepsychology services, and nobody will record the session without your permission. You agree to use the telephone or video-conferencing platform selected for our virtual sessions.  Understanding and verbal agreement was attested to by the patient.    The purpose of the meeting was reviewed as indicated, and limits of confidentiality reviewed as needed. Patient stated an understanding of the information and agreed to the session.       Symptoms: low mood half the time, insomnia, some anhedonia    Focus of treatment: skills building, changing unhelpful responses, symptom reduction, learning about relapse cues    Session Content:   Feeling generally ok, noticing small improvements. She is delegating more at work, and the department did restructuring of responsibilities so she and her group have less of a list. She and her supervisor had been discussing giving people on the teams responsibilities in projects or to attend meetings, to give them experience and take more off her plate. Feels good to delegate. She'd anticipated feeling guilty about doing this, but it's felt good so far. This is a new experience and a pleasant surprise she's felt good about the changes.     She is  "opening up more to her , sharing more about how she is feeling and what she would like from him (day dates, supportive comments). \"I'm not holding it all in as much as I was. I was self-isolating to not burden him.\" She's focusing on her needs are in the relationship and sharing that with him. Has also been hearing what he's sharing; \"I was ready to hear it.\" She learned he was concerned about losing their relationship, feeling alone after losses in his family and her isolation was adding to it. She was ready to hear it and understand his perspective, and figure out how to make helpful changes. She is working on being more present and focused in their conversations to help him feel less alone. It also helps her to figure out what she needs in order to be more present.     She is challenging herself to do more activity at home to get back into tasks/chores she tends to put off more when she is feeling down. She becomes more sedentary and puts chores off, letting them pile up. She recognized doing behavioral activation is helping.     Additional historical information:   First time seeking psychotherapy. History of depression; has taken medication from PCP before and is currently. Small support system, close with immediate family. Promotion at work and having difficulty coping with work demands/setting appropriate limits. Self-described people pleaser. Finds challenges with weight management and chronic vestibular issues to be stressors and to negatively impact her mood.     Current Psychiatric Medications:  Celexa 30 mg, from PCP for last 2 years.     Objective:  Mental Status  Orientation and consciousness: [x ]oriented X 3 and attentive     [ ]other, explain:   Appearance/grooming :    [x ]appropriate [ ]poor grooming/hygiene bizarre appearance    [ ]other, explain:    Behavior: [x ]appropriate to context.   [ ]inappropriate and/or bizarre, explain:    Speech: [x]normal rate/rhythm [ ]pressured speech []slow "    [ ]other,    Language: [x ]normal    [ ]abnormalities noted, explain:    Mood: [ ]euthymic [ x]depressed [ ]dysphoric [ ]anxious    [ ]euphoric [ ] other   Affect: [x]mood congruent    []incongruent, explain:    Evidence of perceptual disturbances (hallucinations, illusions, etc.):    [ x]no    [ ]yes, explain:   Thought processes:     [x ]normal and congruent     [ ]other, explain:    Insight: [ ]good [x ]adequate [ ]poor []questionable   Judgment: [ ]good [x ]adequate [ ]poor []questionable   Fund of Knowledge:[ ]above average [x ]average [ ]below average    Risk Assessment  Homicidal intentions: no  Homicidal plan: no  Suicidal intentions: no  Suicidal plan: no  Risk of harm low at this time.      Therapeutic Intervention:   [ x] Psychosocial  [ ] Treatment Goals  [ x] Cognitive/Behavioral- interventions appropriate to exploring identifying personal boundaries and engaging in limit setting with herself. Discussed coping mechanisms she has and role of people pleasing in her decisions. Also talked about her behavioral activation work, gave education on behavioral activation and noted her effort to return to household tasks falls in this category. Explored what she's learning about herself from the changes she's made at work, in communication with her , and with household tasks.    [ x] Psychoeducation   [ ] Insight/Psychodynamic  [ ] Problem solving  [ x] Supportive  [ ] Referral to additional/other treatment/resources      Diagnosis: MDD, recurrent, mild, current episode    Treatment Plan/Recommendations:   rtc in about 4 weeks for individual psychotherapy with this psychologist via video telehealth. Patient agreed to appointment frequency and plan. Patient has scheduling contact info to use as needed. Upcoming appointment already scheduled.       Prognosis: good    Progress to date: some, invested and motivated. Progress as noted.       Alexis Huff, PhD

## 2024-03-26 ENCOUNTER — APPOINTMENT (OUTPATIENT)
Dept: BEHAVIORAL HEALTH | Facility: CLINIC | Age: 44
End: 2024-03-26
Payer: COMMERCIAL

## 2024-04-02 ENCOUNTER — TELEMEDICINE (OUTPATIENT)
Dept: BEHAVIORAL HEALTH | Facility: CLINIC | Age: 44
End: 2024-04-02
Payer: COMMERCIAL

## 2024-04-02 DIAGNOSIS — F54 PSYCHOLOGICAL FACTOR AFFECTING PHYSICAL CONDITION: Primary | ICD-10-CM

## 2024-04-02 DIAGNOSIS — F33.0 MILD EPISODE OF RECURRENT MAJOR DEPRESSIVE DISORDER (CMS-HCC): ICD-10-CM

## 2024-04-02 PROCEDURE — 90834 PSYTX W PT 45 MINUTES: CPT | Performed by: PSYCHOLOGIST

## 2024-04-02 PROCEDURE — 3008F BODY MASS INDEX DOCD: CPT | Performed by: PSYCHOLOGIST

## 2024-04-02 NOTE — PROGRESS NOTES
"Behavioral Medicine Psychotherapy Progress Note      IDENTIFYING AND REFERRAL INFORMATION:  Emerald Marie is a 43 y.o. able-bodied  employed White female patient who presented for follow-up.     Start Time: 3 pm  End Time: 4 pm  Time Spent with Patient: 45 minutes    Session number 4 with this psychologist.    This is a virtual video visit.    Telephone/Televideo Informed Consent for Psychotherapy was reviewed with the patient as follows:  There are potential benefits and risks of the use of telephone or video-conferencing that differ from in-person sessions. Specifically, the telephone or televideo system we are using may not be HIPPA compliant and may present limits to patient confidentiality. Confidentiality still applies for telepsychology services, and nobody will record the session without your permission. You agree to use the telephone or video-conferencing platform selected for our virtual sessions.  Understanding and verbal agreement was attested to by the patient.    The purpose of the meeting was reviewed as indicated, and limits of confidentiality reviewed as needed. Patient stated an understanding of the information and agreed to the session.       Symptoms: low mood half the time, insomnia, some anhedonia    Focus of treatment: skills building, changing unhelpful responses, symptom reduction, learning about relapse cues    Session Content:   Feeling generally ok, noticing small improvements. Mood feels close to baseline and has for a few weeks; hoping to maintain this. Feels effective in her effort at limit setting with herself at work and home. She continues to prioritize communication at home. She realized it had broken down when her symptoms were the worst. She is focusing on being open to hearing what her  is saying and sharing what she needs/thinks. She noticed that she tends to \"shut down\" when she is feeling lower and she will focus on someone else's hurtful comments said in " anger instead of the important content of the conversation. She said they were in a pattern of being less defensive in their communication in the past and are working on getting back to that. She's learned she can lean on her  and ask for non-judgmental support if she needs it. She is also considering asking him if he notices certain things to let her know, because she is recognizing signs of early depression and realized this time she did not notice them or was not taking the signs seriously so she wants to ask for support from him.     Additional historical information:   First time seeking psychotherapy. History of depression; has taken medication from PCP before and is currently. Small support system, close with immediate family. Promotion at work and having difficulty coping with work demands/setting appropriate limits. Self-described people pleaser. Finds challenges with weight management and chronic vestibular issues to be stressors and to negatively impact her mood.     Current Psychiatric Medications:  Celexa 30 mg, from PCP for last 2 years.     Objective:  Mental Status  Orientation and consciousness: [x ]oriented X 3 and attentive     [ ]other, explain:   Appearance/grooming :    [x ]appropriate [ ]poor grooming/hygiene bizarre appearance    [ ]other, explain:    Behavior: [x ]appropriate to context.   [ ]inappropriate and/or bizarre, explain:    Speech: [x]normal rate/rhythm [ ]pressured speech []slow    [ ]other,    Language: [x ]normal    [ ]abnormalities noted, explain:    Mood: [ ]euthymic [ x]depressed [ ]dysphoric [ ]anxious    [ ]euphoric [ ] other   Affect: [x]mood congruent    []incongruent, explain:    Evidence of perceptual disturbances (hallucinations, illusions, etc.):    [ x]no    [ ]yes, explain:   Thought processes:     [x ]normal and congruent     [ ]other, explain:    Insight: [ ]good [x ]adequate [ ]poor []questionable   Judgment: [ ]good [x ]adequate [ ]poor []questionable    Fund of Knowledge:[ ]above average [x ]average [ ]below average    Risk Assessment  Homicidal intentions: no  Homicidal plan: no  Suicidal intentions: no  Suicidal plan: no  Risk of harm low at this time.      Therapeutic Intervention:   [ x] Psychosocial  [ ] Treatment Goals  [ x] Cognitive/Behavioral- interventions appropriate to exploring identifying personal boundaries and engaging in limit setting with herself. Discussed coping mechanisms she has and role of people pleasing in her decisions. Also talked about effect of her efforts on her symptoms and what she's learned from how she responded to this most recent depressive episode. Explored what she's learning about herself from the changes she's made.    [ x] Psychoeducation   [ ] Insight/Psychodynamic  [ ] Problem solving  [ x] Supportive  [ ] Referral to additional/other treatment/resources      Diagnosis: MDD, recurrent, mild, current episode partial remission    Treatment Plan/Recommendations:   rtc in about 3 weeks for individual psychotherapy with this psychologist via video telehealth. Patient agreed to appointment frequency and plan. Patient has scheduling contact info to use as needed. Upcoming appointment already scheduled.       Prognosis: good    Progress to date: some, invested and motivated. Progress as noted.       Alexis Huff, PhD

## 2024-04-04 ENCOUNTER — OFFICE VISIT (OUTPATIENT)
Dept: NEUROLOGY | Facility: CLINIC | Age: 44
End: 2024-04-04
Payer: COMMERCIAL

## 2024-04-04 VITALS
RESPIRATION RATE: 18 BRPM | BODY MASS INDEX: 48.06 KG/M2 | HEART RATE: 84 BPM | WEIGHT: 280 LBS | DIASTOLIC BLOOD PRESSURE: 83 MMHG | SYSTOLIC BLOOD PRESSURE: 142 MMHG

## 2024-04-04 DIAGNOSIS — G43.809 VESTIBULAR MIGRAINE: Primary | ICD-10-CM

## 2024-04-04 PROCEDURE — RXMED WILLOW AMBULATORY MEDICATION CHARGE

## 2024-04-04 PROCEDURE — 99213 OFFICE O/P EST LOW 20 MIN: CPT | Performed by: PSYCHIATRY & NEUROLOGY

## 2024-04-04 PROCEDURE — 3008F BODY MASS INDEX DOCD: CPT | Performed by: PSYCHIATRY & NEUROLOGY

## 2024-04-04 RX ORDER — TOPIRAMATE 50 MG/1
50 TABLET, COATED ORAL 2 TIMES DAILY
Qty: 180 TABLET | Refills: 1 | Status: SHIPPED | OUTPATIENT
Start: 2024-04-04 | End: 2024-04-04 | Stop reason: SDUPTHER

## 2024-04-04 RX ORDER — TOPIRAMATE 50 MG/1
50 TABLET, FILM COATED ORAL 2 TIMES DAILY
Qty: 180 TABLET | Refills: 1 | Status: SHIPPED | OUTPATIENT
Start: 2024-04-04 | End: 2024-10-01

## 2024-04-04 ASSESSMENT — PAIN SCALES - GENERAL: PAINLEVEL: 0-NO PAIN

## 2024-04-04 NOTE — PROGRESS NOTES
Follow up for likely vestibular migraines    Hx  summary from prior  note:  Had brain MRI in 2020: improvement in size of known prolactinoma  For the prolactinoma and she follows with PCP and gets regular labs, used to follow with endocrine. Not on therapy as blood work is stable. Here b/o 10 years of intemittent dizziness, 1-2 times a month, can last for 2-3 days, things are not moving but feels like a sense of spinning forward with eyes closed. With eyes open there is more a feeling of visual discomfort along with some trouble with balance but not falls. During most of these episodes she has to stay in bed, sometimes gets nausea, sometimes with photophobia. No major headaches, last one 2 weeks ago with pain that started behind the left eye then involved the whole forehead, usually feels like pressure tension 4/10 at the most, will take tylenol which helps, can last for a few hours.   Denies syncope. Pt has had holter monitor and ENT evaluation in the past, which were unrevealing.       Interval hx:  In the last 3 months since starting Topamax has not had any episode of dizziness except some brief dizziness when  getting up quickly from bending over. She tolerates Topamax and very happy with the results.   No new symptoms.     FMH:  Grandmother has vertigo    Past Medical History:   Diagnosis Date    Allergy status to unspecified drugs, medicaments and biological substances     History of seasonal allergies    Anemia     Depression     Encounter for gynecological examination (general) (routine) without abnormal findings 09/08/2020    Encounter for annual routine gynecological examination    Encounter for gynecological examination (general) (routine) without abnormal findings 10/15/2019    Encounter for annual routine gynecological examination    Encounter for gynecological examination (general) (routine) without abnormal findings 04/26/2016    Encounter for annual routine gynecological examination    Encounter for  other general counseling and advice on contraception 03/27/2017    Counseling for birth control, oral contraceptives    Encounter for other preprocedural examination 09/03/2020    Preoperative clearance    Encounter for other specified surgical aftercare 02/22/2021    Postoperative observation    Encounter for screening for nutritional disorder     Encounter for vitamin deficiency screening    Encounter for screening for nutritional disorder 08/19/2020    Encounter for special screening examination for nutritional disorder    Infection following a procedure, superficial incisional surgical site, initial encounter 01/25/2021    Superficial incisional infection of surgical site    Obesity, unspecified 09/03/2020    Obesity (BMI 30-39.9)    Other prurigo 04/23/2021    Pruritic rash    Other specified disorders of nose and nasal sinuses 03/06/2020    Sinus pressure    Personal history of diseases of the blood and blood-forming organs and certain disorders involving the immune mechanism     History of anemia    Personal history of diseases of the skin and subcutaneous tissue 12/04/2018    History of pityriasis rosea    Personal history of other benign neoplasm 10/13/2020    History of uterine leiomyoma    Personal history of other diseases of the female genital tract 11/13/2020    History of abnormal uterine bleeding    Personal history of other diseases of the female genital tract 02/05/2020    History of polycystic ovarian syndrome    Personal history of other diseases of the respiratory system 02/21/2015    History of acute sinusitis    Personal history of other specified conditions 06/10/2020    History of dizziness    Personal history of other specified conditions 09/04/2013    History of nipple discharge    Persons encountering health services in other specified circumstances 07/29/2020    Encounter to establish care    Polyp of cervix uteri 09/08/2020    Cervical polyp    Radiculopathy, lumbar region 02/23/2016     Acute lumbar radiculopathy    Right lower quadrant pain 10/15/2019    Abdominal pain, RLQ (right lower quadrant)    Snoring     Snoring      Allergies   Allergen Reactions    Azithromycin Rash    Clarithromycin Rash         Current Outpatient Medications:     cholecalciferol (Vitamin D-3) 25 MCG (1000 UT) tablet, Take 1 tablet (25 mcg) by mouth once daily., Disp: , Rfl:     citalopram (CeleXA) 10 mg tablet, TAKE 1 TABLET BY MOUTH ONE TIME DAILY WITH CITALOPRAM 20MG. TOTAL DOSE OF 30MG., Disp: 90 tablet, Rfl: 3    citalopram (CeleXA) 20 mg tablet, TAKE 1 TABLET BY MOUTH ONE TIME DAILY WITH CITALOPRAM 10MG TABLET. TOTAL DOSE OF 30MG., Disp: 90 tablet, Rfl: 3    ferrous sulfate (IRON ORAL), , Disp: , Rfl:     MAGNESIUM ORAL, , Disp: , Rfl:     meclizine (Antivert) 25 mg tablet, TAKE 1 TABLET BY MOUTH THREE TIMES A DAY AS NEEDED FOR DIZZINESS, Disp: 30 tablet, Rfl: 1    multivitamin tablet, Multi Vitamin Daily TABS  Refills: 0     Active, Disp: , Rfl:     topiramate (Topamax) 25 mg tablet, Take 1 tablet (25 mg) once daily at bedtime for 7 days, THEN 1 tablet (25 mg) 2 times a day for 7 days, THEN 1 tablet (25 mg) in the morning and 2 tablets (50mg) at bedtime, THEN 2 tablets (50 mg) 2 times a day., Disp: 120 tablet, Rfl: 1    ZINC ORAL, , Disp: , Rfl:      Exam:  Normal general appearance.   The patient was alert and oriented to person, place, and time with normal language, attention and concentration, recent and remote memory. Eye movements: normal fixation, no spontaneous or gaze-evoked nystagmus, normal speed and amplitude of horizontal and vertical saccades.  Muscles of mastication and facial expression moved normally.  There was no dysarthria.    Standard gait was normal.      A/P:  44 yo woman with hx of prolactinoma, not on therapy, here for a decade of intermittent episodes of dizziness/vertigo that even without major episodes of headaches, do have some features suggestive of migraines. She had work up to r/o  other etiologies. No further episodes since empirically trying topiramate 3 months ago, which supports the impression of vestibular migraines. Topamax refilled, follow up in 6 months.

## 2024-04-15 ENCOUNTER — PHARMACY VISIT (OUTPATIENT)
Dept: PHARMACY | Facility: CLINIC | Age: 44
End: 2024-04-15
Payer: COMMERCIAL

## 2024-04-23 ENCOUNTER — TELEMEDICINE (OUTPATIENT)
Dept: BEHAVIORAL HEALTH | Facility: CLINIC | Age: 44
End: 2024-04-23
Payer: COMMERCIAL

## 2024-04-23 DIAGNOSIS — F54 PSYCHOLOGICAL FACTOR AFFECTING PHYSICAL CONDITION: ICD-10-CM

## 2024-04-23 DIAGNOSIS — F33.0 MILD EPISODE OF RECURRENT MAJOR DEPRESSIVE DISORDER (CMS-HCC): Primary | ICD-10-CM

## 2024-04-23 PROCEDURE — 90832 PSYTX W PT 30 MINUTES: CPT | Performed by: PSYCHOLOGIST

## 2024-04-23 PROCEDURE — 3008F BODY MASS INDEX DOCD: CPT | Performed by: PSYCHOLOGIST

## 2024-04-23 NOTE — PROGRESS NOTES
Behavioral Medicine Psychotherapy Progress Note      IDENTIFYING AND REFERRAL INFORMATION:  Emerald Marie is a 43 y.o. able-bodied  employed White female patient who presented for follow-up.     Start Time: 4 pm  End Time: 5 pm  Time Spent with Patient: 30 minutes    Session number 5 with this psychologist.    This is a virtual video visit.    Telephone/Televideo Informed Consent for Psychotherapy was reviewed with the patient as follows:  There are potential benefits and risks of the use of telephone or video-conferencing that differ from in-person sessions. Specifically, the telephone or televideo system we are using may not be HIPPA compliant and may present limits to patient confidentiality. Confidentiality still applies for telepsychology services, and nobody will record the session without your permission. You agree to use the telephone or video-conferencing platform selected for our virtual sessions.  Understanding and verbal agreement was attested to by the patient.    The purpose of the meeting was reviewed as indicated, and limits of confidentiality reviewed as needed. Patient stated an understanding of the information and agreed to the session.       Symptoms: low mood half the time, insomnia, some anhedonia    Focus of treatment: skills building, changing unhelpful responses, symptom reduction, learning about relapse cues    Session Content:   Feeling generally ok, noticing small improvements. Mood feels close to baseline and has for a few weeks; hoping to maintain this. Feels effective in her effort at limit setting with herself at work and home. She continues to prioritize communication at home and with her immediate family. Appreciates having recruited loved ones' support.  She is focusing on being open to hearing what her  is saying and sharing what she needs/thinks. She's learned she can lean on her  and ask for non-judgmental support if she needs it. She is also  considering asking him if he notices certain things to let her know, because she learned signs of early depression for her and realized this time she did not notice them or was not taking the signs seriously so she wants to ask for support from him. She has asked for support from her sisters, who are holding her accountable and she appreciates it.     She wants to keep her appointment in 6 weeks for a booster/maintenance session to see how she's doing.     Additional historical information:   First time seeking psychotherapy. History of depression; has taken medication from PCP before and is currently. Small support system, close with immediate family. Promotion at work and having difficulty coping with work demands/setting appropriate limits. Self-described people pleaser. Finds challenges with weight management and chronic vestibular issues to be stressors and to negatively impact her mood.     Current Psychiatric Medications:  Celexa 30 mg, from PCP for last 2 years.     Objective:  Mental Status  Orientation and consciousness: [x ]oriented X 3 and attentive     [ ]other, explain:   Appearance/grooming :    [x ]appropriate [ ]poor grooming/hygiene bizarre appearance    [ ]other, explain:    Behavior: [x ]appropriate to context.   [ ]inappropriate and/or bizarre, explain:    Speech: [x]normal rate/rhythm [ ]pressured speech []slow    [ ]other,    Language: [x ]normal    [ ]abnormalities noted, explain:    Mood: [ ]euthymic [ x]depressed [ ]dysphoric [ ]anxious    [ ]euphoric [ ] other   Affect: [x]mood congruent    []incongruent, explain:    Evidence of perceptual disturbances (hallucinations, illusions, etc.):    [ x]no    [ ]yes, explain:   Thought processes:     [x ]normal and congruent     [ ]other, explain:    Insight: [ ]good [x ]adequate [ ]poor []questionable   Judgment: [ ]good [x ]adequate [ ]poor []questionable   Fund of Knowledge:[ ]above average [x ]average [ ]below average    Risk  Assessment  Homicidal intentions: no  Homicidal plan: no  Suicidal intentions: no  Suicidal plan: no  Risk of harm low at this time.      Therapeutic Intervention:   [ x] Psychosocial  [ ] Treatment Goals  [ x] Cognitive/Behavioral- reviewed strategies she is finding helpful to improve depressive symptoms (communication skills, limit setting and cognitive coping strategies, e.g.) and engaged in relapse prevention interventions. Also talked about effect of her efforts on her symptoms and what she's learned from how she responded to this most recent depressive episode. Explored what she's learning about herself from the changes she's made.    [ x] Psychoeducation   [ ] Insight/Psychodynamic  [ ] Problem solving  [ x] Supportive  [ ] Referral to additional/other treatment/resources      Diagnosis: MDD, recurrent, mild, current episode partial remission    Treatment Plan/Recommendations:   rtc in about 6 weeks for individual psychotherapy with this psychologist via video telehealth. Patient agreed to appointment frequency and plan. Patient has scheduling contact info to use as needed. Upcoming appointment already scheduled. She agreed to cancel appointment in three weeks and keep appointment 6 weeks out for maintenance session.       Prognosis: good    Progress to date: some, invested and motivated. Progress as noted.       Alexis Huff, PhD

## 2024-05-01 ENCOUNTER — TELEPHONE (OUTPATIENT)
Dept: SURGERY | Facility: CLINIC | Age: 44
End: 2024-05-01
Payer: COMMERCIAL

## 2024-05-14 ENCOUNTER — APPOINTMENT (OUTPATIENT)
Dept: BEHAVIORAL HEALTH | Facility: CLINIC | Age: 44
End: 2024-05-14
Payer: COMMERCIAL

## 2024-05-16 NOTE — PROGRESS NOTES
Subjective   Patient ID: Emerald Marie is a 44 y.o. female who presents for No chief complaint on file..  HPI  BARIATRIC CONSULT  START WT:  285     IDEAL WT: 145        START EXCESS:140     HT:64 IN   YRS OF OBESITY: 30  GREATEST WT LOSS IN LBS: 77  PROGRAMS FOR WT LOSS IN THE PAST : DIETITIAN, INJECTIONS, CALORIE RESTRICTION, PROTEIN SPARING MODIFIED FAST, LIMIT SUGAR INTAKE  Review of Systems     Allergy/Immunologic:          HIV / AIDS No.  Hepatitis A No.  Hepatitis B No.  Hepatitis C No.  Immunosuppressent drugs yes.         HEENT:          Headache YES        CARDIOLOGY:          History of Hyperlipidemia YES  Last stress test N/A.  Last echocardiogram N/A.  Chest pain No.  High blood pressure No.  Irregular heart beat No.  Known coronary artery disease No.  Pacemaker No.  Palpitations No.         RESPIRATORY:          Hx steroid use yes.  ER visits or Hospitalizations for breathing problems No.  Sleep Apnea yes, cpap.  TIRADO (dyspnea on exertion) yes  Hx of Asthma/COPD No.         GASTROENTEROLOGY:          Peptic ulcer No, Last EGD N/A, Last UGI N/A.  Colonoscopy Last Colonoscopy N/A.  Heartburn yes         ENDOCRINOLOGY:          Diabetes No.  Thyroid disorder No.         EXTREMITIES:          Varicose Veins No.  Stasis Ulcers No.  Ankle swelling YES Personal history DVT No.  Personal history PE No.  Personal history of other thrombolic events No.  Family history of VTE No.  Known genetic bleeding or clotting disorder No.         FEMALE REPRODUCTIVE:          Uterine fibroids yes.  Ovarian Cyst YES  Infertility NO.  Menstrual history partial hysterectomy.         UROLOGY:          Kidney disease No.  Kidney stones No.  Previous UTIs yes.  Urinary incontinence No.         MUSCULOSKELETAL:          Osteoporosis/Osteopenia No.  Arthritis No.  Joint pain No.         SKIN:          Hidradenitis No.  Open skin wounds No.  Rosacea YES.  Healing problems No.         PSYCHOLOGY:          Anxiety none.   Depression none.  Eating disorder denies.    Objective   Physical Exam    Assessment/Plan            Abi Darling LPN 05/16/24 1:42 PM

## 2024-05-24 ENCOUNTER — OFFICE VISIT (OUTPATIENT)
Dept: SURGERY | Facility: CLINIC | Age: 44
End: 2024-05-24
Payer: COMMERCIAL

## 2024-05-24 VITALS
WEIGHT: 285 LBS | SYSTOLIC BLOOD PRESSURE: 135 MMHG | BODY MASS INDEX: 48.65 KG/M2 | HEART RATE: 83 BPM | HEIGHT: 64 IN | DIASTOLIC BLOOD PRESSURE: 79 MMHG

## 2024-05-24 DIAGNOSIS — E61.1 IRON DEFICIENCY: ICD-10-CM

## 2024-05-24 DIAGNOSIS — E53.8 VITAMIN B12 DEFICIENCY: ICD-10-CM

## 2024-05-24 DIAGNOSIS — G47.33 OBSTRUCTIVE SLEEP APNEA OF ADULT: ICD-10-CM

## 2024-05-24 DIAGNOSIS — D50.9 IRON DEFICIENCY ANEMIA, UNSPECIFIED IRON DEFICIENCY ANEMIA TYPE: ICD-10-CM

## 2024-05-24 DIAGNOSIS — N39.3 STRESS INCONTINENCE: ICD-10-CM

## 2024-05-24 DIAGNOSIS — K43.2 INCISIONAL HERNIA, WITHOUT OBSTRUCTION OR GANGRENE: ICD-10-CM

## 2024-05-24 DIAGNOSIS — E66.01 MORBID OBESITY (MULTI): Primary | ICD-10-CM

## 2024-05-24 PROCEDURE — 3008F BODY MASS INDEX DOCD: CPT | Performed by: SURGERY

## 2024-05-24 PROCEDURE — 99204 OFFICE O/P NEW MOD 45 MIN: CPT | Performed by: SURGERY

## 2024-05-24 ASSESSMENT — COLUMBIA-SUICIDE SEVERITY RATING SCALE - C-SSRS
1. IN THE PAST MONTH, HAVE YOU WISHED YOU WERE DEAD OR WISHED YOU COULD GO TO SLEEP AND NOT WAKE UP?: NO
6. HAVE YOU EVER DONE ANYTHING, STARTED TO DO ANYTHING, OR PREPARED TO DO ANYTHING TO END YOUR LIFE?: NO
2. HAVE YOU ACTUALLY HAD ANY THOUGHTS OF KILLING YOURSELF?: NO

## 2024-05-24 ASSESSMENT — LIFESTYLE VARIABLES
HOW OFTEN DO YOU HAVE SIX OR MORE DRINKS ON ONE OCCASION: NEVER
SKIP TO QUESTIONS 9-10: 1
HOW MANY STANDARD DRINKS CONTAINING ALCOHOL DO YOU HAVE ON A TYPICAL DAY: 1 OR 2
HOW OFTEN DO YOU HAVE A DRINK CONTAINING ALCOHOL: 2-4 TIMES A MONTH
AUDIT-C TOTAL SCORE: 2

## 2024-05-24 ASSESSMENT — PATIENT HEALTH QUESTIONNAIRE - PHQ9
2. FEELING DOWN, DEPRESSED OR HOPELESS: NOT AT ALL
1. LITTLE INTEREST OR PLEASURE IN DOING THINGS: NOT AT ALL
SUM OF ALL RESPONSES TO PHQ9 QUESTIONS 1 AND 2: 0

## 2024-05-24 ASSESSMENT — PAIN SCALES - GENERAL: PAINLEVEL: 4

## 2024-06-01 PROBLEM — E61.1 IRON DEFICIENCY: Status: ACTIVE | Noted: 2024-06-01

## 2024-06-01 PROBLEM — N39.3 STRESS INCONTINENCE: Status: ACTIVE | Noted: 2024-06-01

## 2024-06-01 PROBLEM — E53.8 VITAMIN B12 DEFICIENCY: Status: ACTIVE | Noted: 2024-06-01

## 2024-06-01 PROBLEM — K43.2 INCISIONAL HERNIA, WITHOUT OBSTRUCTION OR GANGRENE: Status: ACTIVE | Noted: 2024-06-01

## 2024-06-01 NOTE — H&P
History Of Present Illness  Emerald Marie is a 44 y.o. here for consultation for bariatric surgery. She suffers from morbid obesity and has been overweight for many years and has a number of weight related comorbidities. She has attempted to lose weight several times over the years. She has had successes but has regained the weight at the completion of each of her dieting attempts. She is being referred for surgical intervention for her clinically significant morbid obesity.     -Bariatric Consultation:          YRS OF OBESITY: 30  GREATEST WT LOSS IN LBS: 77  PROGRAMS FOR WT LOSS IN THE PAST : DIETITIAN, INJECTIONS, CALORIE RESTRICTION, PROTEIN SPARING MODIFIED FAST, LIMIT SUGAR INTAKE.     Past Medical History  Past Medical History:   Diagnosis Date    Allergy status to unspecified drugs, medicaments and biological substances     History of seasonal allergies    Anemia     Anemia     Anxiety     Depression     Encounter for gynecological examination (general) (routine) without abnormal findings 09/08/2020    Encounter for annual routine gynecological examination    Encounter for gynecological examination (general) (routine) without abnormal findings 10/15/2019    Encounter for annual routine gynecological examination    Encounter for gynecological examination (general) (routine) without abnormal findings 04/26/2016    Encounter for annual routine gynecological examination    Encounter for other general counseling and advice on contraception 03/27/2017    Counseling for birth control, oral contraceptives    Encounter for other preprocedural examination 09/03/2020    Preoperative clearance    Encounter for other specified surgical aftercare 02/22/2021    Postoperative observation    Encounter for screening for nutritional disorder     Encounter for vitamin deficiency screening    Encounter for screening for nutritional disorder 08/19/2020    Encounter for special screening examination for nutritional disorder     History of prediabetes     Hyperlipidemia     Infection following a procedure, superficial incisional surgical site, initial encounter 01/25/2021    Superficial incisional infection of surgical site    Morbid obesity (Multi)     Obesity, unspecified 09/03/2020    Obesity (BMI 30-39.9)    Other prurigo 04/23/2021    Pruritic rash    Other specified disorders of nose and nasal sinuses 03/06/2020    Sinus pressure    PCOS (polycystic ovarian syndrome)     Personal history of diseases of the blood and blood-forming organs and certain disorders involving the immune mechanism     History of anemia    Personal history of diseases of the skin and subcutaneous tissue 12/04/2018    History of pityriasis rosea    Personal history of other benign neoplasm 10/13/2020    History of uterine leiomyoma    Personal history of other diseases of the female genital tract 11/13/2020    History of abnormal uterine bleeding    Personal history of other diseases of the female genital tract 02/05/2020    History of polycystic ovarian syndrome    Personal history of other diseases of the respiratory system 02/21/2015    History of acute sinusitis    Personal history of other specified conditions 06/10/2020    History of dizziness    Personal history of other specified conditions 09/04/2013    History of nipple discharge    Persons encountering health services in other specified circumstances 07/29/2020    Encounter to establish care    Polyp of cervix uteri 09/08/2020    Cervical polyp    Prolactinoma (Multi)     Radiculopathy, lumbar region 02/23/2016    Acute lumbar radiculopathy    Right lower quadrant pain 10/15/2019    Abdominal pain, RLQ (right lower quadrant)    Sleep apnea     Snoring     Snoring    Vestibular migraine        Surgical History  Past Surgical History:   Procedure Laterality Date    OTHER SURGICAL HISTORY  08/18/2021    Partial hysterectomy    OTHER SURGICAL HISTORY  2015    Lumbar laminectomy    OTHER SURGICAL  HISTORY  10/15/2019    [place] tooth extraction    OTHER SURGICAL HISTORY  12/22/2016    Dental Surgery        Social History  She reports that she quit smoking about 14 months ago. Her smoking use included cigarettes. She started smoking about 16 years ago. She has a 15 pack-year smoking history. She has been exposed to tobacco smoke. She has never used smokeless tobacco. She reports current alcohol use. She reports that she does not use drugs.    Family History  Family History   Problem Relation Name Age of Onset    Heart failure Mother Valeria     Hypertension Mother Valeria     Diabetes Mother Valeria     Diabetes Father Ramon     Diabetes Sister Esperanza     Breast cancer Maternal Grandmother Sujatha 50 - 59    Stomach cancer Maternal Grandmother Sujatha     Uterine cancer Maternal Grandmother Sujatha     Cancer Maternal Grandmother Sujatha     Alzheimer's disease Maternal Grandfather      Diabetes Maternal Grandfather      Heart failure Paternal Grandmother dat     Vision loss Paternal Grandmother dat     Cancer Paternal Grandfather Ramon         Allergies  Azithromycin and Clarithromycin    Review of Systems   Allergy/Immunologic:          HIV / AIDS No.  Hepatitis A No.  Hepatitis B No.  Hepatitis C No.  Immunosuppressent drugs yes.         HEENT:          Headache negative.         CARDIOLOGY:          History of Hyperlipidemia No.  Last stress test N/A.  Last echocardiogram N/A.  Chest pain No.  High blood pressure No.  Irregular heart beat No.  Known coronary artery disease No.  Pacemaker No.  Palpitations No.         RESPIRATORY:          Hx steroid use yes.  ER visits or Hospitalizations for breathing problems No.  Sleep Apnea yes, cpap.  TIRADO (dyspnea on exertion) yes  Hx of Asthma/COPD No.         GASTROENTEROLOGY:          Peptic ulcer No, Last EGD N/A, Last UGI N/A.  Colonoscopy Last Colonoscopy N/A.  Heartburn yes         ENDOCRINOLOGY:          Diabetes No.  Thyroid disorder No.          "EXTREMITIES:          Varicose Veins No.  Stasis Ulcers No.  Ankle swelling No.  Personal history DVT No.  Personal history PE No.  Personal history of other thrombolic events No.  Family history of VTE No.  Known genetic bleeding or clotting disorder No.         FEMALE REPRODUCTIVE:          Uterine fibroids yes.  Ovarian Cyst No.  Infertility yes.  Menstrual history partial hysterectomy.         UROLOGY:          Kidney disease No.  Kidney stones No.  Previous UTIs yes.  Urinary incontinence No.         MUSCULOSKELETAL:          Osteoporosis/Osteopenia No.  Arthritis No.  Joint pain No.         SKIN:          Hidradenitis No.  Open skin wounds No.  Rosacea No.  Healing problems No.         PSYCHOLOGY:          Anxiety none.  Depression none.  Eating disorder denies.    Physical Exam       General Examination:         GENERAL APPEARANCE: alert and oriented x 3, Pleasant and cooperative, No Acute Distress.          HEENT: PERRLA.          HEART: regular rate and rhythm.          LUNGS: clear to auscultation bilaterally.          CHEST: normal shape and expansion.          ABDOMEN: obese, palpable hernia at site of incision for hysterectomy, soft and not tender, no guarding, no CVA tenderness.          EXTREMITIES: pulses 2 plus bilaterally, trace bilateral edema, no ulcerations.          SKIN: normal, no rash.          NEUROLOGIC EXAM: CN's II-XII grossly intact, gait normal.          BACK: no CVA tenderness.       Last Recorded Vitals  Blood pressure 135/79, pulse 83, height 1.626 m (5' 4\"), weight 129 kg (285 lb), last menstrual period 10/22/2020.    Relevant Results       Latest Reference Range & Units 10/21/23 10:03   LEUKOCYTES (10*3/UL) IN BLOOD BY AUTOMATED COUNT, Irish 4.4 - 11.3 x10*3/uL 6.6   nRBC 0.0 - 0.0 /100 WBCs 0.0   ERYTHROCYTES (10*6/UL) IN BLOOD BY AUTOMATED COUNT, Irish 4.00 - 5.20 x10*6/uL 4.09   HEMOGLOBIN 12.0 - 16.0 g/dL 10.8 (L)   HEMATOCRIT 36.0 - 46.0 % 35.1 (L)   MCV 80 - 100 fL 86 "   MCH 26.0 - 34.0 pg 26.4   MCHC 32.0 - 36.0 g/dL 30.8 (L)   RED CELL DISTRIBUTION WIDTH 11.5 - 14.5 % 13.4   PLATELETS (10*3/UL) IN BLOOD AUTOMATED COUNT, GEOFFREY 150 - 450 x10*3/uL 285   MEAN PLATELET VOLUME 7.5 - 11.5 fL 10.8   (L): Data is abnormally low     Assessment/Plan   Problem List Items Addressed This Visit             ICD-10-CM    Iron deficiency anemia D50.9    Obstructive sleep apnea of adult G47.33    Incisional hernia, without obstruction or gangrene K43.2    Stress incontinence N39.3    Iron deficiency E61.1    Relevant Orders    CBC and Auto Differential    Ferritin    Iron and TIBC    Vitamin B12 deficiency E53.8    Relevant Orders    Vitamin B12     Other Visit Diagnoses         Codes    Morbid obesity (Multi)    -  Primary E66.01    Relevant Orders    Referral to Psychology            We spent 45 minutes reviewing the three surgical options available in the treatment of morbid obesity in our practice. We reviewed the laparoscopic approach to the Charity-en-Y gastric bypass, the vertical sleeve gastrectomy, and the adjustable gastric band. We reviewed the surgical technique, the risks, and my complication rates. We also reviewed the expected weight loss, the rules necessary for brian-term success, the nutritional supplementation recommended for each operation, and the importance of incorporating excercise into the lifestyle to maintain the weight. We reviewed both the national history with each operation, my experience with each operation, the lack of long-term weight loss data with the vertical sleeve gastrectomy and the potential for exacerbating reflux with the vertical sleeve gastrectomy. In addition, we discussed the risk of adhesions, internal hernias, iron and calcium deficiency, dumping syndrome and potential for gastrojejunal ulcer with the RYGB. Emerald would like to proceed with vertical sleeve gastrectomy.  We discussed that bariatric surgery is not compatible with smoking or vaping and  that she needs to quit vaping.  She has an incisional hernia that we will address after she loses weight.  She has known iron deficiency anemia.  I recommended EGD and colonoscopy as well as iron studies as it has been some time since she has had labwork.       I spent 48  minutes in the professional and overall care of this patient.      Jose Galdamez MD

## 2024-06-03 ENCOUNTER — LAB (OUTPATIENT)
Dept: LAB | Facility: LAB | Age: 44
End: 2024-06-03
Payer: COMMERCIAL

## 2024-06-03 DIAGNOSIS — E53.8 VITAMIN B12 DEFICIENCY: ICD-10-CM

## 2024-06-03 DIAGNOSIS — E61.1 IRON DEFICIENCY: ICD-10-CM

## 2024-06-03 LAB
BASOPHILS # BLD AUTO: 0.06 X10*3/UL (ref 0–0.1)
BASOPHILS NFR BLD AUTO: 0.6 %
EOSINOPHIL # BLD AUTO: 0.16 X10*3/UL (ref 0–0.7)
EOSINOPHIL NFR BLD AUTO: 1.5 %
ERYTHROCYTE [DISTWIDTH] IN BLOOD BY AUTOMATED COUNT: 13.3 % (ref 11.5–14.5)
FERRITIN SERPL-MCNC: 112 NG/ML (ref 8–150)
HCT VFR BLD AUTO: 37.6 % (ref 36–46)
HGB BLD-MCNC: 11.5 G/DL (ref 12–16)
IMM GRANULOCYTES # BLD AUTO: 0.05 X10*3/UL (ref 0–0.7)
IMM GRANULOCYTES NFR BLD AUTO: 0.5 % (ref 0–0.9)
IRON SATN MFR SERPL: 24 % (ref 25–45)
IRON SERPL-MCNC: 75 UG/DL (ref 35–150)
LYMPHOCYTES # BLD AUTO: 3.5 X10*3/UL (ref 1.2–4.8)
LYMPHOCYTES NFR BLD AUTO: 32.6 %
MCH RBC QN AUTO: 26.4 PG (ref 26–34)
MCHC RBC AUTO-ENTMCNC: 30.6 G/DL (ref 32–36)
MCV RBC AUTO: 86 FL (ref 80–100)
MONOCYTES # BLD AUTO: 0.85 X10*3/UL (ref 0.1–1)
MONOCYTES NFR BLD AUTO: 7.9 %
NEUTROPHILS # BLD AUTO: 6.13 X10*3/UL (ref 1.2–7.7)
NEUTROPHILS NFR BLD AUTO: 56.9 %
NRBC BLD-RTO: 0 /100 WBCS (ref 0–0)
PLATELET # BLD AUTO: 369 X10*3/UL (ref 150–450)
RBC # BLD AUTO: 4.35 X10*6/UL (ref 4–5.2)
TIBC SERPL-MCNC: 315 UG/DL (ref 240–445)
UIBC SERPL-MCNC: 240 UG/DL (ref 110–370)
VIT B12 SERPL-MCNC: 214 PG/ML (ref 211–911)
WBC # BLD AUTO: 10.8 X10*3/UL (ref 4.4–11.3)

## 2024-06-03 PROCEDURE — 85025 COMPLETE CBC W/AUTO DIFF WBC: CPT

## 2024-06-03 PROCEDURE — 36415 COLL VENOUS BLD VENIPUNCTURE: CPT

## 2024-06-03 PROCEDURE — 83550 IRON BINDING TEST: CPT

## 2024-06-03 PROCEDURE — 82728 ASSAY OF FERRITIN: CPT

## 2024-06-03 PROCEDURE — 82607 VITAMIN B-12: CPT

## 2024-06-03 PROCEDURE — 83540 ASSAY OF IRON: CPT

## 2024-06-04 ENCOUNTER — APPOINTMENT (OUTPATIENT)
Dept: BEHAVIORAL HEALTH | Facility: CLINIC | Age: 44
End: 2024-06-04
Payer: COMMERCIAL

## 2024-06-13 ENCOUNTER — OFFICE VISIT (OUTPATIENT)
Dept: OBSTETRICS AND GYNECOLOGY | Facility: CLINIC | Age: 44
End: 2024-06-13
Payer: COMMERCIAL

## 2024-06-13 ENCOUNTER — LAB (OUTPATIENT)
Dept: LAB | Facility: LAB | Age: 44
End: 2024-06-13
Payer: COMMERCIAL

## 2024-06-13 VITALS
HEIGHT: 64 IN | WEIGHT: 281.4 LBS | BODY MASS INDEX: 48.04 KG/M2 | SYSTOLIC BLOOD PRESSURE: 130 MMHG | DIASTOLIC BLOOD PRESSURE: 87 MMHG

## 2024-06-13 DIAGNOSIS — Z12.31 ENCOUNTER FOR SCREENING MAMMOGRAM FOR MALIGNANT NEOPLASM OF BREAST: ICD-10-CM

## 2024-06-13 DIAGNOSIS — R55 VASOMOTOR INSTABILITY: ICD-10-CM

## 2024-06-13 DIAGNOSIS — Z01.419 WELL WOMAN EXAM: Primary | ICD-10-CM

## 2024-06-13 DIAGNOSIS — Z13.29 SCREENING FOR ENDOCRINE DISORDER: ICD-10-CM

## 2024-06-13 DIAGNOSIS — L68.0 HIRSUTISM: ICD-10-CM

## 2024-06-13 LAB — TSH SERPL DL<=0.05 MIU/L-ACNC: 1.43 MIU/L (ref 0.27–4.2)

## 2024-06-13 PROCEDURE — 99386 PREV VISIT NEW AGE 40-64: CPT | Performed by: OBSTETRICS & GYNECOLOGY

## 2024-06-13 PROCEDURE — 83002 ASSAY OF GONADOTROPIN (LH): CPT

## 2024-06-13 PROCEDURE — RXMED WILLOW AMBULATORY MEDICATION CHARGE

## 2024-06-13 PROCEDURE — 3008F BODY MASS INDEX DOCD: CPT | Performed by: OBSTETRICS & GYNECOLOGY

## 2024-06-13 PROCEDURE — 83001 ASSAY OF GONADOTROPIN (FSH): CPT

## 2024-06-13 PROCEDURE — 84443 ASSAY THYROID STIM HORMONE: CPT

## 2024-06-13 RX ORDER — SPIRONOLACTONE 100 MG/1
100 TABLET, FILM COATED ORAL DAILY
Qty: 30 TABLET | Refills: 11 | Status: SHIPPED | OUTPATIENT
Start: 2024-06-13 | End: 2025-06-13

## 2024-06-13 ASSESSMENT — LIFESTYLE VARIABLES
SKIP TO QUESTIONS 9-10: 0
AUDIT-C TOTAL SCORE: 3
HOW OFTEN DO YOU HAVE A DRINK CONTAINING ALCOHOL: 2-4 TIMES A MONTH
HOW MANY STANDARD DRINKS CONTAINING ALCOHOL DO YOU HAVE ON A TYPICAL DAY: 1 OR 2
HOW OFTEN DO YOU HAVE SIX OR MORE DRINKS ON ONE OCCASION: LESS THAN MONTHLY

## 2024-06-13 ASSESSMENT — ENCOUNTER SYMPTOMS
OCCASIONAL FEELINGS OF UNSTEADINESS: 0
DEPRESSION: 0
LOSS OF SENSATION IN FEET: 0

## 2024-06-13 ASSESSMENT — PAIN SCALES - GENERAL: PAINLEVEL: 0-NO PAIN

## 2024-06-13 ASSESSMENT — PATIENT HEALTH QUESTIONNAIRE - PHQ9
2. FEELING DOWN, DEPRESSED OR HOPELESS: NOT AT ALL
SUM OF ALL RESPONSES TO PHQ9 QUESTIONS 1 & 2: 0
1. LITTLE INTEREST OR PLEASURE IN DOING THINGS: NOT AT ALL

## 2024-06-13 NOTE — PROGRESS NOTES
ESTABLISHED ANNUAL GYN VISIT     Patient Name:  Emerald Marie  :  1980  MR #:  [id number]  Acct #:  [id number]      ASSESSMENT/PLAN:   1. Well woman exam      2. Encounter for screening mammogram for malignant neoplasm of breast    - BI mammo bilateral screening tomosynthesis; Future    3. Screening for endocrine disorder    - FSH; Future  - Luteinizing hormone; Future  - TSH with reflex to Free T4 if abnormal; Future    4. Vasomotor instability      5. Hirsutism    - spironolactone (Aldactone) 100 mg tablet; Take 1 tablet (100 mg) by mouth once daily.  Dispense: 30 tablet; Refill: 11   .All questions answered.    Counseling:  Medication education:  Education:  All new and/or current medications discussed and reviewed including side effects with patient/caregiver, Understanding:  Caregiver/Patient expressed understanding., Adherence:  Barriers to adherence identified and discussed if present,   Recommended Inositol  Recommended Vitron C iron  Test ovarian function today  Discussed estrogen patch or gel.  OB/GYN Preventive:     - Pap smear indicated every 5 years if normal and otherwise low risk - Next Pap smear due Never due to hysterectomy  - Self breast exam monthly and clinical breast examination yearly discussed - Next Mammogram due 10/2024   - Screening colonoscopy recommended starting age 45, then Q3-10 years depending on testing and family history - Next screen per Dr. Fausto Rachel   - Osteoporosis prevention discussion included vit d3/calcium supplements, weight-bearing exercise - DEXA scan due 67  - Genitourinary skin hygiene discussed.  - Diet/Weight management discussed.      Chief Complaint:  Annual exam    HPI:  Emerald Marie is a 44 y.o. F  Patient's last menstrual period was 10/22/2020. for annual exam.  Perimenopausal or menopausal syndrome complaints: +vasomotor; +mood swings; +skin changes of increased facial hair.  GYNH:   Yes, first onset 14  LMP:  Patient's last menstrual  period was 10/22/2020.  HPV Vaccine No.  Sexual activity Yes - . Coitarche Yes - .  Birth control history    Past Medical History:   Diagnosis Date    Abnormal Pap smear of cervix     Allergy status to unspecified drugs, medicaments and biological substances     History of seasonal allergies    Anemia     Anemia     Anxiety     Depression     Encounter for gynecological examination (general) (routine) without abnormal findings 09/08/2020    Encounter for annual routine gynecological examination    Encounter for gynecological examination (general) (routine) without abnormal findings 10/15/2019    Encounter for annual routine gynecological examination    Encounter for gynecological examination (general) (routine) without abnormal findings 04/26/2016    Encounter for annual routine gynecological examination    Encounter for other general counseling and advice on contraception 03/27/2017    Counseling for birth control, oral contraceptives    Encounter for other preprocedural examination 09/03/2020    Preoperative clearance    Encounter for other specified surgical aftercare 02/22/2021    Postoperative observation    Encounter for screening for nutritional disorder     Encounter for vitamin deficiency screening    Encounter for screening for nutritional disorder 08/19/2020    Encounter for special screening examination for nutritional disorder    Endometriosis 2020    Fibroid 2020    History of prediabetes     Hyperlipidemia     Infection following a procedure, superficial incisional surgical site, initial encounter 01/25/2021    Superficial incisional infection of surgical site    Morbid obesity (Multi)     Obesity, unspecified 09/03/2020    Obesity (BMI 30-39.9)    Other prurigo 04/23/2021    Pruritic rash    Other specified disorders of nose and nasal sinuses 03/06/2020    Sinus pressure    PCOS (polycystic ovarian syndrome)     Personal history of diseases of the blood and blood-forming organs and certain disorders  involving the immune mechanism     History of anemia    Personal history of diseases of the skin and subcutaneous tissue 2018    History of pityriasis rosea    Personal history of other benign neoplasm 10/13/2020    History of uterine leiomyoma    Personal history of other diseases of the female genital tract 2020    History of abnormal uterine bleeding    Personal history of other diseases of the female genital tract 2020    History of polycystic ovarian syndrome    Personal history of other diseases of the respiratory system 2015    History of acute sinusitis    Personal history of other specified conditions 06/10/2020    History of dizziness    Personal history of other specified conditions 2013    History of nipple discharge    Persons encountering health services in other specified circumstances 2020    Encounter to establish care    Polycystic ovary syndrome 2014    Polyp of cervix uteri 2020    Cervical polyp    Prolactinoma (Multi)     Radiculopathy, lumbar region 2016    Acute lumbar radiculopathy    Right lower quadrant pain 10/15/2019    Abdominal pain, RLQ (right lower quadrant)    Sleep apnea     Snoring     Snoring    Urinary tract infection     Varicella     Vestibular migraine        Past Surgical History:   Procedure Laterality Date    COLPOSCOPY      OTHER SURGICAL HISTORY  2021    Partial hysterectomy    OTHER SURGICAL HISTORY      Lumbar laminectomy    OTHER SURGICAL HISTORY  10/15/2019    [place] tooth extraction    OTHER SURGICAL HISTORY  2016    Dental Surgery       Social History     Tobacco Use    Smoking status: Former     Current packs/day: 0.00     Average packs/day: 1 pack/day for 15.0 years (15.0 ttl pk-yrs)     Types: Cigarettes     Start date: 3/20/2008     Quit date: 3/20/2023     Years since quittin.2     Passive exposure: Past    Smokeless tobacco: Never   Vaping Use    Vaping status: Every Day    Substances:  Nicotine    Devices: Disposable   Substance Use Topics    Alcohol use: Yes     Comment: 2-3 drinks a month    Drug use: Never        Family History   Problem Relation Name Age of Onset    Heart failure Mother Valeria     Hypertension Mother Valeria     Diabetes Mother Valeria     Diabetes Father Ramon     Diabetes Sister Esperanza     Breast cancer Maternal Grandmother Sujatha 50 - 59    Stomach cancer Maternal Grandmother Sujatha     Uterine cancer Maternal Grandmother Sujatha     Cancer Maternal Grandmother Sujatha     Alzheimer's disease Maternal Grandfather Lan     Diabetes Maternal Grandfather Lan     Stroke Maternal Grandfather Lan     Heart failure Paternal Grandmother Ramon     Vision loss Paternal Grandmother Ramon     Cancer Paternal Grandfather Ramon        OB History          0    Para   0    Term   0       0    AB   0    Living   0         SAB   0    IAB   0    Ectopic   0    Multiple   0    Live Births   0                  Prior to Admission medications    Medication Sig Start Date End Date Taking? Authorizing Provider   citalopram (CeleXA) 10 mg tablet TAKE 1 TABLET BY MOUTH ONE TIME DAILY WITH CITALOPRAM 20MG. TOTAL DOSE OF 30MG. 10/20/23 10/19/24 Yes VASHTI Shields   citalopram (CeleXA) 20 mg tablet TAKE 1 TABLET BY MOUTH ONE TIME DAILY WITH CITALOPRAM 10MG TABLET. TOTAL DOSE OF 30MG. 10/20/23 10/19/24 Yes VASHTI Shields   ferrous sulfate (IRON ORAL)    Yes Historical Provider, MD   multivitamin tablet Multi Vitamin Daily TABS   Refills: 0       Active   Yes Historical Provider, MD   topiramate (Topamax) 50 mg tablet Take 1 tablet (50 mg) by mouth 2 times a day. 4/4/24 10/1/24 Yes Forest Ramirez MD PhD   cholecalciferol (Vitamin D-3) 25 MCG (1000 UT) tablet Take 1 tablet (25 mcg) by mouth once daily.    Historical Provider, MD   MAGNESIUM ORAL     Historical Provider, MD   meclizine (Antivert) 25 mg tablet TAKE 1 TABLET BY MOUTH THREE TIMES A DAY AS  "NEEDED FOR DIZZINESS 7/21/23 7/20/24  Johanna Johnson, APRN-CNP   ZINC ORAL     Historical Provider, MD       Allergies   Allergen Reactions    Azithromycin Rash    Clarithromycin Rash     HIVES         ROS:   [facility] - WOMEN ONLY:          Breast Lump No acute changes.  Hot Flashes no.  Painful [place] no.  Vaginal Discharge no.       [facility] - ROS Update:          Unexplained Weight Change no.  Pain anywhere in your body no.  Black or bloody stools no.  Problems with urination no.  Rashes or sores no.  Sexual problems no.  Depression or anxiety problems no.  Do you feel threatened by anyone No.      OBJECTIVE:   /87   Ht 1.626 m (5' 4\")   Wt 128 kg (281 lb 6.4 oz)   LMP 10/22/2020   BMI 48.30 kg/m²   Body mass index is 48.3 kg/m².     Physical Exam  GENERAL:   General Appearance:  well-developed, obese, no functional handicap, well-groomed.  Hygiene:  good.  Ill-appearance:  none.  Mental Status:  alert and oriented.  Speech:  clear.  Eye contact:  normal.  Appears stated age:  yes.    LUNGS:  CTAB.  Effort:  no respiratory distress.    HEART:  HRRR with S1/S2 w/o M/C/R  BREASTS: General:  no masses, no tenderness, no skin changes, no nipple abnormality, and no axillary lymphadenopathy.   ABDOMEN: Tenderness:  none.  Distention:  none.   GENITOURINARY - FEMALE: Bladder:  normal.  Pelvic support defects:  not seen .  External genitalia:  Normal.  Urethra:  Normal.  Vagina:  no lesions, moderate discharge.  Cervix/ cuff:  normal appearance; Pap collected.  Uterus:  normal size/shape/consistency, non tender.  Adnexa:  normal , non tender.   DERMATOLOGY:  Skin:  managed facial hair on chin, upper lip  EXTREMITIES: Normal:  no anomalies.  Edema:  none.    NEUROLOGICAL: Orientation:  alert and oriented x 3  PSYCHOLOGY: Affect:  appropriate.  Mood:  pleasant.    Labs reviewed: Yes -     Imaging reviewed: Yes -   Note: This dictation was generated using Dragon voice recognition software. Please excuse any " grammatical or spelling errors that may have occurred using the system.

## 2024-06-14 LAB
FSH SERPL-ACNC: 7.5 IU/L
LH SERPL-ACNC: 2.4 IU/L

## 2024-06-15 ENCOUNTER — PHARMACY VISIT (OUTPATIENT)
Dept: PHARMACY | Facility: CLINIC | Age: 44
End: 2024-06-15
Payer: COMMERCIAL

## 2024-06-17 NOTE — PROGRESS NOTES
Initial Medical Weight Loss Appointment ([facility] 1)     Starting Weight: 285 lb  Current Weight: 280 lb  Current BMI: 48.06    Diet Experience: Emerald reports that she has thought about bariatric surgery for awhile. She began the process of obtaining bariatric surgery through a [facility]'s program in ~2020. She reports that these visits were right around COVID so all of them were virtual. She met with the surgeon once and the dietitian twice, also completing a psychological evaluation. She report that she eventually discontinued the program possibly due to COVID/virtual visits and overall life. In the past she tried diets such as Weight Watchers and a protein-sparing modified diet in which she saw the most success with- an 86 lb wt loss over 1 year.   Medical hx: PCOS. Pt notes she currently vapes.   Pt Seeking: unknown at this time.   Pertinent Co-morbidities: sleep apnea. Uses cpap consistently since dx in 2019.   Current Daily Intake: 2-3 meals/day.   Breakfast- 2 scrambled eggs, OR instant oatmeal, OR cereal, OR [employer] brand greek yogurt, OR sometimes skips   Lunch- leftovers from dinner meal, OR a salad from office salad bar, OR a Subway sandwich   Dinner- always a protein (ex. Beef, chicken, pork, shellfish) and a vegetable (ex. Salad)   How many times do you order takeout, fast food and/or go out to eat per week? 2x   Snacks: a beef stick, Skinnypop popcorn, a bag of chip/pretzels with hummus, candy on occasion   Beverages: mostly water, 2 cups of coffee/day with half&half, occasionally iced tea/soda, whole milk when having cereal   Alcohol Intake: 2 drinks per week on avg.   Food Allergies? NKFAS   Food Intolerances? No   Food Dislikes? Fish, liver   Do you eat when you are not hungry and/or when you feel full? Yes   Do you eat when you are bored/stressed/emotional? Yes, stress eating per pt.   Current Exercise/Exercise Hx: walking a few times per week for ~40 minutes. Exercise hx includes membership to  the [employer] (workout classes, weight lifting, running). Pt notes she currently has a [employer] membership and a treadmill at home.   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 Herself and her    Who does the cooking/grocery shopping? Her and her  do both.   What do you want to get out of surgery? To lose weight, feel more comfortable, be able to do more activities, and to relive some pain   Motivation to change (1-10): 10     Estimated ability to achieve goals: good     Today's Lesson: Review of Dr. Galdamez's lifelong rules, calorie counting, food label reading, promoting feelings of satiety, and energy balance.  Diet Goals: Practice the lifelong rules  Exercise Recommendations: Gradually work up to 150 minutes of moderate intensity exercise per week.  Behavior Changes: will be assessed every month  Behavior Goals:  1. Consistently incorporate 3 meals per day.   2. Ensure an intake of at least 20 grams of protein per meal.   3. Start to practice not drinking with meals.     Plan: Will follow up next month. Will continue to monitor pt's weight, dietary & behavior changes. Pt notes she completed her psych evaluation on 6/18 with Dr. Granados.         Joan Keen RD, LDN  Registered Dietitian, Licensed Dietitian Nutritionist

## 2024-06-18 ENCOUNTER — APPOINTMENT (OUTPATIENT)
Dept: SURGERY | Facility: CLINIC | Age: 44
End: 2024-06-18
Payer: COMMERCIAL

## 2024-06-18 ENCOUNTER — APPOINTMENT (OUTPATIENT)
Dept: BEHAVIORAL HEALTH | Facility: CLINIC | Age: 44
End: 2024-06-18
Payer: COMMERCIAL

## 2024-06-18 VITALS
OXYGEN SATURATION: 97 % | WEIGHT: 278 LBS | DIASTOLIC BLOOD PRESSURE: 91 MMHG | BODY MASS INDEX: 47.72 KG/M2 | SYSTOLIC BLOOD PRESSURE: 157 MMHG | HEART RATE: 101 BPM

## 2024-06-18 DIAGNOSIS — F54 PSYCHOLOGICAL FACTORS AFFECTING MEDICAL CONDITION: ICD-10-CM

## 2024-06-18 DIAGNOSIS — Z01.818 PRE-OP EVALUATION: ICD-10-CM

## 2024-06-18 DIAGNOSIS — E53.8 B12 DEFICIENCY: ICD-10-CM

## 2024-06-18 DIAGNOSIS — E07.9 DISEASE OF THYROID GLAND: ICD-10-CM

## 2024-06-18 DIAGNOSIS — G47.33 OBSTRUCTIVE SLEEP APNEA: Primary | ICD-10-CM

## 2024-06-18 DIAGNOSIS — E61.0 COPPER DEFICIENCY: ICD-10-CM

## 2024-06-18 DIAGNOSIS — D50.8 IRON DEFICIENCY ANEMIA SECONDARY TO INADEQUATE DIETARY IRON INTAKE: ICD-10-CM

## 2024-06-18 DIAGNOSIS — E66.01 MORBID OBESITY (MULTI): Primary | ICD-10-CM

## 2024-06-18 DIAGNOSIS — D68.9 COAGULATION DISORDER (MULTI): ICD-10-CM

## 2024-06-18 DIAGNOSIS — G47.33 OBSTRUCTIVE SLEEP APNEA OF ADULT: ICD-10-CM

## 2024-06-18 DIAGNOSIS — R73.9 HYPERGLYCEMIA: ICD-10-CM

## 2024-06-18 DIAGNOSIS — E51.9 THIAMINE DEFICIENCY: ICD-10-CM

## 2024-06-18 DIAGNOSIS — E66.01 MORBID OBESITY (MULTI): ICD-10-CM

## 2024-06-18 DIAGNOSIS — E55.9 VITAMIN D DEFICIENCY: ICD-10-CM

## 2024-06-18 DIAGNOSIS — E63.9 NUTRITIONAL DISORDER: ICD-10-CM

## 2024-06-18 PROCEDURE — 3008F BODY MASS INDEX DOCD: CPT | Performed by: PSYCHOLOGIST

## 2024-06-18 PROCEDURE — 99204 OFFICE O/P NEW MOD 45 MIN: CPT | Performed by: INTERNAL MEDICINE

## 2024-06-18 PROCEDURE — 90791 PSYCH DIAGNOSTIC EVALUATION: CPT | Performed by: PSYCHOLOGIST

## 2024-06-18 PROCEDURE — 3008F BODY MASS INDEX DOCD: CPT | Performed by: INTERNAL MEDICINE

## 2024-06-18 PROCEDURE — 93000 ELECTROCARDIOGRAM COMPLETE: CPT | Performed by: INTERNAL MEDICINE

## 2024-06-18 RX ORDER — UBIDECARENONE 75 MG
500 CAPSULE ORAL DAILY
Qty: 100 TABLET | Refills: 3 | Status: SHIPPED | OUTPATIENT
Start: 2024-06-18 | End: 2025-06-18

## 2024-06-18 ASSESSMENT — ENCOUNTER SYMPTOMS
DIFFICULTY URINATING: 0
DEPRESSION: 0
ABDOMINAL PAIN: 0
FEVER: 0
COUGH: 0
DIARRHEA: 0
ARTHRALGIAS: 1
LOSS OF SENSATION IN FEET: 0
NAUSEA: 0
BACK PAIN: 1
OCCASIONAL FEELINGS OF UNSTEADINESS: 0
CHILLS: 0
CONSTIPATION: 0
WEAKNESS: 0
HEADACHES: 0
SHORTNESS OF BREATH: 0

## 2024-06-18 ASSESSMENT — PATIENT HEALTH QUESTIONNAIRE - PHQ9
2. FEELING DOWN, DEPRESSED OR HOPELESS: NOT AT ALL
SUM OF ALL RESPONSES TO PHQ9 QUESTIONS 1 AND 2: 0
1. LITTLE INTEREST OR PLEASURE IN DOING THINGS: NOT AT ALL

## 2024-06-18 ASSESSMENT — PAIN SCALES - GENERAL: PAINLEVEL: 0-NO PAIN

## 2024-06-18 NOTE — PROGRESS NOTES
Subjective   Patient ID: Emerald Marie is a 44 y.o. female who presents for [facility] visit 1 No chief complaint on file..    Patient has been trying to lose weight for many years by following different diets like Weight Watchers, lost some weight but did not maintain. Currently has 3 meals a day. Breakfast includes 2 scrambled eggs or hot cereal. Lunch consists of homemade dinner leftovers For dinner, usually burgers, chicken, vegetables and salads. Snacks on pretzels and chips . Drinks mostly water with 1-2 cups of coffee in the morning and the occ afternoon tea. Uses nicotine vape. Uses a CPAP (5 years old) and tolerates it well. Regarding exercise, ... Takes Topamax for vestibular migraines.        Diagnostics Reviewed: 6/18 EKG nml  Labs Reviewed: 6/03 B12 borderline low, TSH nml, Ferritin nml, cholesterol, slightly anemic           Review of Systems   Constitutional:  Negative for chills and fever.        Excessive daytime somnolence   HENT:  Negative for dental problem.    Respiratory:  Negative for cough and shortness of breath.    Gastrointestinal:  Negative for abdominal pain, constipation, diarrhea and nausea.   Genitourinary:  Negative for difficulty urinating.   Musculoskeletal:  Positive for arthralgias and back pain.   Neurological:  Negative for weakness and headaches.       Objective   BP (!) 157/91 (BP Location: Left arm, Patient Position: Sitting)   Pulse 101   Wt 126 kg (278 lb)   LMP 10/22/2020   SpO2 97%   BMI 47.72 kg/m²     Physical Exam  Constitutional:       General: She is not in acute distress.     Appearance: She is obese.   HENT:      Mouth/Throat:      Comments: Dentition WNL  Cardiovascular:      Rate and Rhythm: Normal rate and regular rhythm.      Heart sounds: Normal heart sounds.   Pulmonary:      Breath sounds: Normal breath sounds.   Abdominal:      Palpations: Abdomen is soft.      Tenderness: There is no abdominal tenderness.   Musculoskeletal:      Cervical back: No  tenderness.      Right lower leg: No edema.      Left lower leg: No edema.   Lymphadenopathy:      Cervical: No cervical adenopathy.   Skin:     General: Skin is warm.      Findings: No erythema.   Neurological:      Mental Status: She is alert and oriented to person, place, and time.      Gait: Gait is intact. Gait normal.   Psychiatric:         Mood and Affect: Mood normal.         Behavior: Behavior normal.         Assessment/Plan   Diagnoses and all orders for this visit:  Obstructive sleep apnea  -     aPTT; Future  -     Hemoglobin A1C; Future  -     Folate; Future  -     Comprehensive Metabolic Panel; Future  -     Lipid Panel; Future  -     Protime-INR; Future  -     Vitamin D 25-Hydroxy,Total (for eval of Vitamin D levels); Future  -     Parathyroid Hormone, Intact; Future  -     Vitamin B1, Whole Blood; Future  -     Copper, Blood; Future  -     Vitamin A; Future  -     Vitamin E; Future  -     Zinc, Serum or Plasma; Future  Pre-op evaluation  -     ECG 12 lead (Clinic Performed)  -     aPTT; Future  -     Hemoglobin A1C; Future  -     Folate; Future  -     Comprehensive Metabolic Panel; Future  -     Lipid Panel; Future  -     Protime-INR; Future  -     Vitamin D 25-Hydroxy,Total (for eval of Vitamin D levels); Future  -     Parathyroid Hormone, Intact; Future  -     Vitamin B1, Whole Blood; Future  -     Copper, Blood; Future  -     Vitamin A; Future  -     Vitamin E; Future  -     Zinc, Serum or Plasma; Future  Morbid obesity (Multi)  -     aPTT; Future  -     Hemoglobin A1C; Future  -     Folate; Future  -     Comprehensive Metabolic Panel; Future  -     Lipid Panel; Future  -     Protime-INR; Future  -     Vitamin D 25-Hydroxy,Total (for eval of Vitamin D levels); Future  -     Parathyroid Hormone, Intact; Future  -     Vitamin B1, Whole Blood; Future  -     Copper, Blood; Future  -     Vitamin A; Future  -     Vitamin E; Future  -     Zinc, Serum or Plasma; Future  Obstructive sleep apnea of adult  -      aPTT; Future  -     Hemoglobin A1C; Future  -     Folate; Future  -     Comprehensive Metabolic Panel; Future  -     Lipid Panel; Future  -     Protime-INR; Future  -     Vitamin D 25-Hydroxy,Total (for eval of Vitamin D levels); Future  -     Parathyroid Hormone, Intact; Future  -     Vitamin B1, Whole Blood; Future  -     Copper, Blood; Future  -     Vitamin A; Future  -     Vitamin E; Future  -     Zinc, Serum or Plasma; Future  Coagulation disorder (Multi)  -     aPTT; Future  -     Hemoglobin A1C; Future  -     Folate; Future  -     Comprehensive Metabolic Panel; Future  -     Lipid Panel; Future  -     Protime-INR; Future  -     Vitamin D 25-Hydroxy,Total (for eval of Vitamin D levels); Future  -     Parathyroid Hormone, Intact; Future  -     Vitamin B1, Whole Blood; Future  -     Copper, Blood; Future  -     Vitamin A; Future  -     Vitamin E; Future  -     Zinc, Serum or Plasma; Future  Copper deficiency  -     aPTT; Future  -     Hemoglobin A1C; Future  -     Folate; Future  -     Comprehensive Metabolic Panel; Future  -     Lipid Panel; Future  -     Protime-INR; Future  -     Vitamin D 25-Hydroxy,Total (for eval of Vitamin D levels); Future  -     Parathyroid Hormone, Intact; Future  -     Vitamin B1, Whole Blood; Future  -     Copper, Blood; Future  -     Vitamin A; Future  -     Vitamin E; Future  -     Zinc, Serum or Plasma; Future  Hyperglycemia  -     aPTT; Future  -     Hemoglobin A1C; Future  -     Folate; Future  -     Comprehensive Metabolic Panel; Future  -     Lipid Panel; Future  -     Protime-INR; Future  -     Vitamin D 25-Hydroxy,Total (for eval of Vitamin D levels); Future  -     Parathyroid Hormone, Intact; Future  -     Vitamin B1, Whole Blood; Future  -     Copper, Blood; Future  -     Vitamin A; Future  -     Vitamin E; Future  -     Zinc, Serum or Plasma; Future  B12 deficiency  -     aPTT; Future  -     Hemoglobin A1C; Future  -     Folate; Future  -     Comprehensive Metabolic  Panel; Future  -     Lipid Panel; Future  -     Protime-INR; Future  -     Vitamin D 25-Hydroxy,Total (for eval of Vitamin D levels); Future  -     Parathyroid Hormone, Intact; Future  -     Vitamin B1, Whole Blood; Future  -     Copper, Blood; Future  -     Vitamin A; Future  -     Vitamin E; Future  -     Zinc, Serum or Plasma; Future  -     cyanocobalamin (Vitamin B-12) 500 mcg tablet; Take 1 tablet (500 mcg) by mouth once daily.  Disease of thyroid gland  -     aPTT; Future  -     Hemoglobin A1C; Future  -     Folate; Future  -     Comprehensive Metabolic Panel; Future  -     Lipid Panel; Future  -     Protime-INR; Future  -     Vitamin D 25-Hydroxy,Total (for eval of Vitamin D levels); Future  -     Parathyroid Hormone, Intact; Future  -     Vitamin B1, Whole Blood; Future  -     Copper, Blood; Future  -     Vitamin A; Future  -     Vitamin E; Future  -     Zinc, Serum or Plasma; Future  Iron deficiency anemia secondary to inadequate dietary iron intake  -     aPTT; Future  -     Hemoglobin A1C; Future  -     Folate; Future  -     Comprehensive Metabolic Panel; Future  -     Lipid Panel; Future  -     Protime-INR; Future  -     Vitamin D 25-Hydroxy,Total (for eval of Vitamin D levels); Future  -     Parathyroid Hormone, Intact; Future  -     Vitamin B1, Whole Blood; Future  -     Copper, Blood; Future  -     Vitamin A; Future  -     Vitamin E; Future  -     Zinc, Serum or Plasma; Future  Nutritional disorder  -     aPTT; Future  -     Hemoglobin A1C; Future  -     Folate; Future  -     Comprehensive Metabolic Panel; Future  -     Lipid Panel; Future  -     Protime-INR; Future  -     Vitamin D 25-Hydroxy,Total (for eval of Vitamin D levels); Future  -     Parathyroid Hormone, Intact; Future  -     Vitamin B1, Whole Blood; Future  -     Copper, Blood; Future  -     Vitamin A; Future  -     Vitamin E; Future  -     Zinc, Serum or Plasma; Future  Thiamine deficiency  -     aPTT; Future  -     Hemoglobin A1C;  Future  -     Folate; Future  -     Comprehensive Metabolic Panel; Future  -     Lipid Panel; Future  -     Protime-INR; Future  -     Vitamin D 25-Hydroxy,Total (for eval of Vitamin D levels); Future  -     Parathyroid Hormone, Intact; Future  -     Vitamin B1, Whole Blood; Future  -     Copper, Blood; Future  -     Vitamin A; Future  -     Vitamin E; Future  -     Zinc, Serum or Plasma; Future  Vitamin D deficiency  -     aPTT; Future  -     Hemoglobin A1C; Future  -     Folate; Future  -     Comprehensive Metabolic Panel; Future  -     Lipid Panel; Future  -     Protime-INR; Future  -     Vitamin D 25-Hydroxy,Total (for eval of Vitamin D levels); Future  -     Parathyroid Hormone, Intact; Future  -     Vitamin B1, Whole Blood; Future  -     Copper, Blood; Future  -     Vitamin A; Future  -     Vitamin E; Future  -     Zinc, Serum or Plasma; Future       Scribe Attestation  By signing my name below, Gabriela HENAO Scribe   attest that this documentation has been prepared under the direction and in the presence of Lizet Khanna MD.

## 2024-06-20 NOTE — PROGRESS NOTES
Pre-Bariatric Surgery Psychological Evaluation    Session Start Time: 8:04 am  Session End Time:  8:40 am    Televideo Informed Consent for psychological evaluation was reviewed with the patient as follows:  There are potential benefits and risks of the use of telephone or video-conferencing that differ from in-person sessions. Specifically, the telephone or televideo system we are using may not be HIPPA compliant and may present limits to patient confidentiality. Confidentiality still applies for telepsychology services, and nobody will record the session without your permission.      Understanding and verbal agreement was attested to by the patient. Patient identity was confirmed using 3 sources, including telephone number, email address and date of birth. Provision of services via telehealth was necessitated by the restrictions on face-to-face visits accompanying the COVID-19 pandemic.     Non-secure Note: The patient has consented to an nonrestricted note.     I had the pleasure of seeing Ms. Emerald Marie at your request for behavioral evaluation for appropriateness for bariatric surgery.  As you know, Ms. Marie is a 44 y.o. who reports a current weight of 280 pounds.     Weight History  Ms. Marie states that in high school she weighed between 160 to 180 pounds.  She states that she has gradually gained weight over time and her maximum weight was 287 pounds about a couple weeks ago.       Psychosocial History  Ms. Marie states that she lives in [facility] with her  Homero. She is employed as the  [employer].  She states that she and her  have no children and one dog.  She reports that she grew up in [place] with both of her parents and her two sisters.  She lives about a mile away from her parents now. She states that she had a good Dad but that he was gone a lot due to work. Her mother was a stay at home mom.  Her parents did split up for some time when she was about [age]  but then got back together and have stayed . She reports that she has a good relationship with her parents and sisters now. She states that her childhood was free from trauma.      Psychological Status: Ms. Marie states that she has a history of depression and anxiety.  She takes Celexa 30 mg per day and this is helpful to her.  She states that both depression and anxiety started in her teen years.  She has had some therapy and has found that helpful as well.  She states that her history is negative for obsessive-compulsive disorder, eating disorders, mahesh, thought disorders, and alcohol and drug abuse.  She does have some history of emotional eating but feels she has that more managed now.    Mental Status Exam:  Orientation:  Alert. Oriented x3.  Memory: intact.  Attention/Concentration: Normal/ Good.  Appearance:  Well-groomed. Casually Dressed. Good hygiene.   Behavior/Attitude: Cooperative. Pleasant. Good eye contact.  Motor: Relaxed. Calm. Normal motor activity.   Speech: Regular rate and volume. Fluent. No pressure.   Mood: Euthymic  Affect: Congruent to stated mood.   Thought process: Goal-directed. Linear. Organized.  Thought content: No paranoia, delusion or ideas of reference. No hallucinations in auditory, visual or other sensory modalities.   Suicidal ideation: denied.  Homicidal ideation: denied.   Insight: Good  Judgment: Good  Fund of knowledge: Above Average    Behavioral issues relevant for candidacy for bariatric surgery include:     1) Motivation: Ms. Marie states that she is highly motivated for surgery for health reasons.  She states that she has PCOS and as she gets older she is not able to keep weight off after any attempts at dieting.     2) History of compliance: Ms. Marie reports no history of problems with compliance with medication regimens.    3) History of attempts to lose weight:  Ms. Marie has tried and failed at more conservative approaches to weight loss.  She has tried  several of the popular diets with some success, but she has always gained the weight back over time.     4) Coping resources and social support: Ms. Marie feels able to cope with any challenges that might arise after the surgery and reports significant support from both family and friends in her pursuit of bariatric surgery.     5) Ability to maintain behavior post-surgery:  In my opinion, Ms. Marie is well-prepared to handle the behavioral demands that are consequent to bariatric surgery.  She is fully aware of the changes she needs to make to be successful and is already putting these in place. She did a protein based modified fasting diet before and this tought her to measure her quantities carefully.  She plans to use walking for exercise inside and outside and we discussed the value of adding strength training to her routine.     6) Psychological contraindications to surgery:  A comprehensive review of psychological symptoms reveals no contraindications to surgery.     Impressions    Emerald Marie is able to provide informed consent and is an appropriate candidate for bariatric surgery from the psychological perspective.       Behavioral confirmation of her candidacy will come in the form of her ability to adhere to her current dietary plan. Should she fare poorly with this adherence trial, please consider recommending a follow-up visit with a provider from our office for support and therapy.  She stated she is open to that type of referral if it is needed.     Additionally, we had an extended discussion about behavioral responses to surgery for which she should be vigilant.  We discussed the post-surgical risks of mood deterioration, substance misuse, the development of compulsive behaviors and the development of maladaptive coping responses.  She expressed understanding of these risks and agreed to contact our office with appropriate haste if any of these maladaptive responses were to develop after surgery.        Thank you for allowing me to collaborate in the evaluation of your patient. Please feel free to contact me if I can provide any additional information.    Yakov Thurston, Ph.D.  Clinical Psychologist  [facility], # [id number] [address] [facility]  (o) [phone]

## 2024-06-21 ENCOUNTER — APPOINTMENT (OUTPATIENT)
Dept: SURGERY | Facility: CLINIC | Age: 44
End: 2024-06-21
Payer: COMMERCIAL

## 2024-06-21 VITALS — WEIGHT: 280 LBS | BODY MASS INDEX: 48.06 KG/M2

## 2024-06-24 ENCOUNTER — APPOINTMENT (OUTPATIENT)
Dept: BEHAVIORAL HEALTH | Facility: CLINIC | Age: 44
End: 2024-06-24
Payer: COMMERCIAL

## 2024-06-24 DIAGNOSIS — F33.42 RECURRENT MAJOR DEPRESSIVE DISORDER, IN FULL REMISSION (CMS-HCC): Primary | ICD-10-CM

## 2024-06-24 PROCEDURE — 90834 PSYTX W PT 45 MINUTES: CPT | Performed by: PSYCHOLOGIST

## 2024-06-24 PROCEDURE — 3008F BODY MASS INDEX DOCD: CPT | Performed by: PSYCHOLOGIST

## 2024-06-24 NOTE — PROGRESS NOTES
Behavioral Medicine Psychotherapy Progress Note      IDENTIFYING AND REFERRAL INFORMATION:  Emerald Marie is a 44 y.o. able-bodied  employed White female patient who presented for follow-up.      Start Time: 4 pm  End Time: 5 pm  Time Spent with Patient: 40 minutes    Session number 6 with this psychologist.    This is a virtual video visit.    Telephone/Televideo Informed Consent for Psychotherapy was reviewed with the patient as needed. The purpose of the meeting was reviewed as indicated, and limits of confidentiality reviewed as needed. Patient stated an understanding of the information and agreed to the session.       Symptoms: low mood and anhedonia resolved, insomnia better but still present.    Focus of treatment: skills building, changing unhelpful responses, symptom reduction, learning about relapse cues    Session Content:   She reported mood and motivation are at baseline. Mood has been consistent since last appointment. She is keeping up on tools she identified as helpful for mood regulation including communication skills, appropriate support seeking from loved ones, and strategies to reduce self-isolation behaviors. She reviewed relapse prevention as well, felt confident that info covered during this session and last session was sufficient to know what to look for. She mentioned she learned she may be in perimenopause and that could affect sleep, mood, and anxiety levels. She is also in bariatric program for weight management and considering bariatric surgery. Provider gave info about behavioral medicine interventions she could access if mental health needs arise in context of perimenopausal/menopausal transition or lifestyle changes in bariatric program. She is aware she can request appointments with this provider or another  health psychologist if needs arise in the future.     Made plan for PRN follow-up since mood and depressive symptoms were maintained at baseline for over 2 months.      Current Psychiatric Medications:  Celexa 30 mg, from PCP for last 2 years.     Objective:  Mental Status  Orientation and consciousness: [x ]oriented X 3 and attentive     [ ]other, explain:   Appearance/grooming :    [x ]appropriate [ ]poor grooming/hygiene bizarre appearance    [ ]other, explain:    Behavior: [x ]appropriate to context.   [ ]inappropriate and/or bizarre, explain:    Speech: [x]normal rate/rhythm [ ]pressured speech []slow    [ ]other,    Language: [x ]normal    [ ]abnormalities noted, explain:    Mood: [ ]euthymic [ x]depressed [ ]dysphoric [ ]anxious    [ ]euphoric [ ] other   Affect: [x]mood congruent    []incongruent, explain:    Evidence of perceptual disturbances (hallucinations, illusions, etc.):    [ x]no    [ ]yes, explain:   Thought processes:     [x ]normal and congruent     [ ]other, explain:    Insight: [ ]good [x ]adequate [ ]poor []questionable   Judgment: [ ]good [x ]adequate [ ]poor []questionable   Fund of Knowledge:[ ]above average [x ]average [ ]below average    Risk Assessment  Homicidal intentions: no  Homicidal plan: no  Suicidal intentions: no  Suicidal plan: no  Risk of harm low at this time.      Therapeutic Intervention:   [ x] Psychosocial  [ ] Treatment Goals  [ x] Cognitive/Behavioral- reviewed strategies she is finding helpful to improve depressive symptoms (communication skills, limit setting and cognitive coping strategies, e.g.) and engaged in relapse prevention interventions. Also talked about effect of her efforts on her symptoms and what she's learned from how she responded to this most recent depressive episode. Explored what she's learning about herself from the changes she's made.    [ x] Psychoeducation   [ ] Insight/Psychodynamic  [ ] Problem solving  [ x] Supportive  [ ] Referral to additional/other treatment/resources      Diagnosis: MDD, recurrent, mild, current episode partial remission    Treatment Plan/Recommendations:   No plan to RTC with this  psychologist via video telehealth. She reported her goals are met and no current follow-up needed. Patient has scheduling contact info to use if needs change.     Continue with Celexa via PCP, if indicated. Discuss changes with prescribing provider.       Prognosis: good    Progress to date: goals met, per observation and per patient report.       Alexis Huff, PhD

## 2024-07-08 PROCEDURE — RXMED WILLOW AMBULATORY MEDICATION CHARGE

## 2024-07-10 ENCOUNTER — PHARMACY VISIT (OUTPATIENT)
Dept: PHARMACY | Facility: CLINIC | Age: 44
End: 2024-07-10
Payer: COMMERCIAL

## 2024-07-10 PROCEDURE — RXMED WILLOW AMBULATORY MEDICATION CHARGE

## 2024-07-11 ENCOUNTER — PHARMACY VISIT (OUTPATIENT)
Dept: PHARMACY | Facility: CLINIC | Age: 44
End: 2024-07-11
Payer: COMMERCIAL

## 2024-07-22 NOTE — PROGRESS NOTES
Medical Weight Loss Appointment (MWL 2)  Virtual appt.     Current Weight: 281 lbs / wt reported by pt.   Current BMI: 48.23    Current Diet: practicing most of the lifelong rules.  Adherence: good.   Daily Intake: mostly 3 meals/day  Breakfast- 2 eggs and some fruit   Lunch- a salad topped with chicken, OR a turkey burger, OR leftovers from dinner. Pt notes sometimes still skipping lunch due to not having anything planned.   Dinner- a grilled/baked protein (ex: chicken, beef, pork, shrimp) with vegetables   Snacks: a piece of fruit, a beef stick, a yogurt, artichoke hearts to serving per pt  Beverages: water, 1 cup of coffee/day with half & half   Exercise: walking 2x/week.   Behavior/Diet Changes: Emerald is practicing not drinking with meals. She has reduced her coffee intake and increased her vegetable intake - especially at the dinner meal. She reports getting better with incorporating 3 meals/day, but still struggling with the lunch meal occasionally.     Estimated ability to achieve goals: good.     Today's Lesson: Reviewed patient's dietary changes and food recall. Provided examples of quick easy meals to have on hand/in the fridge to incorporate for lunch. Reviewed exercise and increasing it as able. Emerald notes she cooks with Ghee- we reviewed fats and limiting/reducing overall fat use.   Diet Goals: Practice the lifelong rules  Exercise Recommendations: Gradually work up to 150 minutes of moderate intensity exercise per week.  Behavior Goals:  1. Increase walking to 3x/week.   2. Consistently incorporate 3 meals/day.     Plan: Will follow up next month. Will continue to monitor pt's weight, dietary & behavior changes.         Joan Keen RD, LDN  Registered Dietitian, Licensed Dietitian Nutritionist

## 2024-07-24 ENCOUNTER — APPOINTMENT (OUTPATIENT)
Dept: SURGERY | Facility: CLINIC | Age: 44
End: 2024-07-24
Payer: COMMERCIAL

## 2024-07-24 VITALS — BODY MASS INDEX: 47.97 KG/M2 | HEIGHT: 64 IN | WEIGHT: 281 LBS

## 2024-07-24 VITALS — WEIGHT: 279 LBS | BODY MASS INDEX: 47.63 KG/M2 | HEIGHT: 64 IN

## 2024-07-24 DIAGNOSIS — E55.9 VITAMIN D DEFICIENCY: ICD-10-CM

## 2024-07-24 DIAGNOSIS — F41.9 ANXIETY: ICD-10-CM

## 2024-07-24 DIAGNOSIS — E53.8 B12 DEFICIENCY: ICD-10-CM

## 2024-07-24 DIAGNOSIS — E66.01 MORBID OBESITY (MULTI): ICD-10-CM

## 2024-07-24 DIAGNOSIS — G47.33 OBSTRUCTIVE SLEEP APNEA OF ADULT: Primary | ICD-10-CM

## 2024-07-24 DIAGNOSIS — D50.8 IRON DEFICIENCY ANEMIA SECONDARY TO INADEQUATE DIETARY IRON INTAKE: ICD-10-CM

## 2024-07-24 PROCEDURE — 99213 OFFICE O/P EST LOW 20 MIN: CPT | Performed by: INTERNAL MEDICINE

## 2024-07-24 PROCEDURE — 3008F BODY MASS INDEX DOCD: CPT | Performed by: INTERNAL MEDICINE

## 2024-07-24 ASSESSMENT — ENCOUNTER SYMPTOMS
BACK PAIN: 1
DIFFICULTY URINATING: 0
ARTHRALGIAS: 1
FEVER: 0
COUGH: 0
CHILLS: 0
LOSS OF SENSATION IN FEET: 0
NAUSEA: 0
WEAKNESS: 0
ABDOMINAL PAIN: 0
DIARRHEA: 0
CONSTIPATION: 0
OCCASIONAL FEELINGS OF UNSTEADINESS: 0
SHORTNESS OF BREATH: 0
HEADACHES: 0
DEPRESSION: 0

## 2024-07-24 ASSESSMENT — PAIN SCALES - GENERAL: PAINLEVEL: 0-NO PAIN

## 2024-07-24 NOTE — PROGRESS NOTES
"Subjective   Patient ID: Emerald Marie is a 44 y.o. female who presents for [facility] visit 2.    Patient currently has 3 meals a day, protein with each meal. Breakfast includes 2 scrambled eggs or hot cereal. Lunch consists of homemade dinner leftovers For dinner, usually burgers, chicken, vegetables and salads. Snacks when stressed but has cut back. Drinks mostly water with 1-2 cups of coffee in the morning and the occ afternoon tea. Uses nicotine vape. Uses a CPAP (5 years old) nightly and tolerates it well. Regarding exercise, she walks a couple days per week. Takes Topamax for vestibular migraines.      Diagnostics Reviewed: 6/18/2024 EKG nml  Labs Reviewed: 6/03/2024 B12 borderline low, TSH nml, Ferritin nml, cholesterol, slightly anemic           Review of Systems   Constitutional:  Negative for chills and fever.        Excessive daytime somnolence   HENT:  Negative for dental problem.    Respiratory:  Negative for cough and shortness of breath.    Gastrointestinal:  Negative for abdominal pain, constipation, diarrhea and nausea.   Genitourinary:  Negative for difficulty urinating.   Musculoskeletal:  Positive for arthralgias and back pain.   Neurological:  Negative for weakness and headaches.       Objective   Ht 1.626 m (5' 4\")   Wt 127 kg (279 lb)   LMP 10/22/2020   BMI 47.89 kg/m²     Physical Exam  Neurological:      Mental Status: She is alert and oriented to person, place, and time.   Psychiatric:         Mood and Affect: Mood normal.         Behavior: Behavior normal.         Assessment/Plan   Diagnoses and all orders for this visit:  Obstructive sleep apnea of adult  Morbid obesity (Multi)  Iron deficiency anemia secondary to inadequate dietary iron intake  B12 deficiency  Vitamin D deficiency  Anxiety         Scribe Attestation  By signing my name below, IGabriela Scribe   attest that this documentation has been prepared under the direction and in the presence of Lizet Khanna MD.  "

## 2024-08-12 NOTE — PROGRESS NOTES
Medical Weight Loss Appointment (MWL 3)    Starting Weight: 285 lbs   Current Weight: 276 lbs / This reflects a 5 lb wt loss x 1 month. Overall down 9 lbs since initial appt.   Current BMI: 47.38    Current Diet: practicing the lifelong rules   Adherence: good.   Daily Intake: 3 meals/day  Breakfast- scrambled eggs with cheese and tomatoes, OR a greek yogurt, OR a Premier Protein shake   Lunch (at 1/2pm)- a Premier Protein shake, OR 3-4 oz of chicken and a vegetable, OR leftovers from dinner   Dinner (at 7/7:30pm)- a burger, OR chicken/shrimp paired with 1-2 vegetables and an occasional starch   Snacks: on occasion a beef stick, a cheese stick or fruit   Beverages: water and 1 cup of coffee/day with half&half  Alcohol intake: a few drinks this past month  Exercise: walking 3x/week for 30-40 minutes  Behavior/Diet Changes: Emerald increased walking to 3x/week and is consistently incorporating 3 meals/day. She is mindful about protein intake and is using a portioned plate at meals.     Estimated ability to achieve goals: good.     Today's Lesson: Reviewed patient's dietary changes and food recall.   Diet Goals: Practice the lifelong rules  Exercise Recommendations: Gradually work up to 150 minutes of moderate intensity exercise per week.  Behavior Goals:  1. Continue to follow the lifelong rules.     Plan: Provided dietary clearance at today's appointment. Weight checks optional per pt.       Joan Keen RD, LDN  Registered Dietitian, Licensed Dietitian Nutritionist

## 2024-08-20 ENCOUNTER — LAB (OUTPATIENT)
Dept: LAB | Facility: LAB | Age: 44
End: 2024-08-20
Payer: COMMERCIAL

## 2024-08-20 DIAGNOSIS — E55.9 VITAMIN D DEFICIENCY: ICD-10-CM

## 2024-08-20 DIAGNOSIS — G47.33 OBSTRUCTIVE SLEEP APNEA OF ADULT: ICD-10-CM

## 2024-08-20 DIAGNOSIS — E07.9 DISEASE OF THYROID GLAND: ICD-10-CM

## 2024-08-20 DIAGNOSIS — E66.01 MORBID OBESITY (MULTI): ICD-10-CM

## 2024-08-20 DIAGNOSIS — E51.9 THIAMINE DEFICIENCY: ICD-10-CM

## 2024-08-20 DIAGNOSIS — Z01.818 PRE-OP EVALUATION: ICD-10-CM

## 2024-08-20 DIAGNOSIS — E53.8 B12 DEFICIENCY: ICD-10-CM

## 2024-08-20 DIAGNOSIS — D68.9 COAGULATION DISORDER (MULTI): ICD-10-CM

## 2024-08-20 DIAGNOSIS — D50.8 IRON DEFICIENCY ANEMIA SECONDARY TO INADEQUATE DIETARY IRON INTAKE: ICD-10-CM

## 2024-08-20 DIAGNOSIS — E61.0 COPPER DEFICIENCY: ICD-10-CM

## 2024-08-20 DIAGNOSIS — E63.9 NUTRITIONAL DISORDER: ICD-10-CM

## 2024-08-20 DIAGNOSIS — G47.33 OBSTRUCTIVE SLEEP APNEA: ICD-10-CM

## 2024-08-20 DIAGNOSIS — R73.9 HYPERGLYCEMIA: ICD-10-CM

## 2024-08-20 LAB
25(OH)D3 SERPL-MCNC: 42 NG/ML (ref 30–100)
ALBUMIN SERPL BCP-MCNC: 4.9 G/DL (ref 3.4–5)
ALP SERPL-CCNC: 51 U/L (ref 33–110)
ALT SERPL W P-5'-P-CCNC: 35 U/L (ref 7–45)
ANION GAP SERPL CALC-SCNC: 15 MMOL/L (ref 10–20)
APTT PPP: 33 SECONDS (ref 27–38)
AST SERPL W P-5'-P-CCNC: 15 U/L (ref 9–39)
BILIRUB SERPL-MCNC: 0.4 MG/DL (ref 0–1.2)
BUN SERPL-MCNC: 11 MG/DL (ref 6–23)
CALCIUM SERPL-MCNC: 10.1 MG/DL (ref 8.6–10.6)
CHLORIDE SERPL-SCNC: 101 MMOL/L (ref 98–107)
CHOLEST SERPL-MCNC: 204 MG/DL (ref 0–199)
CHOLESTEROL/HDL RATIO: 4.9
CO2 SERPL-SCNC: 28 MMOL/L (ref 21–32)
CREAT SERPL-MCNC: 0.78 MG/DL (ref 0.5–1.05)
EGFRCR SERPLBLD CKD-EPI 2021: >90 ML/MIN/1.73M*2
EST. AVERAGE GLUCOSE BLD GHB EST-MCNC: 131 MG/DL
FOLATE SERPL-MCNC: 16.2 NG/ML
GLUCOSE SERPL-MCNC: 105 MG/DL (ref 74–99)
HBA1C MFR BLD: 6.2 %
HDLC SERPL-MCNC: 41.3 MG/DL
INR PPP: 1 (ref 0.9–1.1)
LDLC SERPL CALC-MCNC: 126 MG/DL
NON HDL CHOLESTEROL: 163 MG/DL (ref 0–149)
POTASSIUM SERPL-SCNC: 4.4 MMOL/L (ref 3.5–5.3)
PROT SERPL-MCNC: 7.6 G/DL (ref 6.4–8.2)
PROTHROMBIN TIME: 11.1 SECONDS (ref 9.8–12.8)
PTH-INTACT SERPL-MCNC: 41.5 PG/ML (ref 18.5–88)
SODIUM SERPL-SCNC: 140 MMOL/L (ref 136–145)
TRIGL SERPL-MCNC: 185 MG/DL (ref 0–149)
VLDL: 37 MG/DL (ref 0–40)

## 2024-08-20 PROCEDURE — 82525 ASSAY OF COPPER: CPT

## 2024-08-20 PROCEDURE — 82306 VITAMIN D 25 HYDROXY: CPT

## 2024-08-20 PROCEDURE — 36415 COLL VENOUS BLD VENIPUNCTURE: CPT

## 2024-08-20 PROCEDURE — 80053 COMPREHEN METABOLIC PANEL: CPT

## 2024-08-20 PROCEDURE — 80061 LIPID PANEL: CPT

## 2024-08-20 PROCEDURE — 83970 ASSAY OF PARATHORMONE: CPT

## 2024-08-20 PROCEDURE — 85610 PROTHROMBIN TIME: CPT

## 2024-08-20 PROCEDURE — 84425 ASSAY OF VITAMIN B-1: CPT

## 2024-08-20 PROCEDURE — 84630 ASSAY OF ZINC: CPT

## 2024-08-20 PROCEDURE — 84590 ASSAY OF VITAMIN A: CPT

## 2024-08-20 PROCEDURE — 82746 ASSAY OF FOLIC ACID SERUM: CPT

## 2024-08-20 PROCEDURE — 84446 ASSAY OF VITAMIN E: CPT

## 2024-08-20 PROCEDURE — 83036 HEMOGLOBIN GLYCOSYLATED A1C: CPT

## 2024-08-20 PROCEDURE — 85730 THROMBOPLASTIN TIME PARTIAL: CPT

## 2024-08-21 ENCOUNTER — APPOINTMENT (OUTPATIENT)
Dept: SURGERY | Facility: CLINIC | Age: 44
End: 2024-08-21
Payer: COMMERCIAL

## 2024-08-21 VITALS
HEART RATE: 73 BPM | RESPIRATION RATE: 16 BRPM | SYSTOLIC BLOOD PRESSURE: 123 MMHG | HEIGHT: 64 IN | DIASTOLIC BLOOD PRESSURE: 71 MMHG | BODY MASS INDEX: 47.12 KG/M2 | WEIGHT: 276 LBS

## 2024-08-21 VITALS — WEIGHT: 276 LBS | BODY MASS INDEX: 47.12 KG/M2 | HEIGHT: 64 IN

## 2024-08-21 DIAGNOSIS — D50.8 IRON DEFICIENCY ANEMIA SECONDARY TO INADEQUATE DIETARY IRON INTAKE: ICD-10-CM

## 2024-08-21 DIAGNOSIS — E61.0 COPPER DEFICIENCY: ICD-10-CM

## 2024-08-21 DIAGNOSIS — G47.33 OBSTRUCTIVE SLEEP APNEA OF ADULT: Primary | ICD-10-CM

## 2024-08-21 DIAGNOSIS — E66.01 MORBID OBESITY (MULTI): ICD-10-CM

## 2024-08-21 PROCEDURE — 3008F BODY MASS INDEX DOCD: CPT | Performed by: INTERNAL MEDICINE

## 2024-08-21 PROCEDURE — 99213 OFFICE O/P EST LOW 20 MIN: CPT | Performed by: INTERNAL MEDICINE

## 2024-08-21 ASSESSMENT — ENCOUNTER SYMPTOMS
CHILLS: 0
DIZZINESS: 0
OCCASIONAL FEELINGS OF UNSTEADINESS: 0
ARTHRALGIAS: 1
ABDOMINAL PAIN: 0
FEVER: 0
DIFFICULTY URINATING: 0
CONSTIPATION: 0
COUGH: 0
HEADACHES: 0
DIARRHEA: 0
DEPRESSION: 0
NAUSEA: 0
WEAKNESS: 0
PALPITATIONS: 0
SHORTNESS OF BREATH: 0
BACK PAIN: 1
LOSS OF SENSATION IN FEET: 0

## 2024-08-21 ASSESSMENT — PATIENT HEALTH QUESTIONNAIRE - PHQ9
SUM OF ALL RESPONSES TO PHQ9 QUESTIONS 1 AND 2: 0
2. FEELING DOWN, DEPRESSED OR HOPELESS: NOT AT ALL
1. LITTLE INTEREST OR PLEASURE IN DOING THINGS: NOT AT ALL

## 2024-08-21 ASSESSMENT — PAIN SCALES - GENERAL: PAINLEVEL: 0-NO PAIN

## 2024-08-21 NOTE — PROGRESS NOTES
"Subjective   Patient ID: Emerald Marie is a 44 y.o. female who presents for [facility] visit 3.    Patient currently has 3 meals a day and tries to include protein with each meal. Breakfast includes 2 scrambled eggs or hot cereal. Lunch consists of homemade dinner leftovers For dinner, usually burgers, chicken, vegetables and salads. Snacks when stressed which is either a beef or cheese stick. Drinks mostly water with 1-2 cups of coffee in the morning. Uses nicotine vape. Uses a CPAP (5 years old) nightly and tolerates it well. Regarding exercise, she walks a couple days per week either outside or on the treadmill. Takes Topamax for vestibular migraines.      Diagnostics Reviewed:   Labs Reviewed: 8/20/2024 Lipid Panel cholesterol was 204. 8/20/2024 CMP glucose was 105. Reviewed other bloodwork which was in wnl.          Review of Systems   Constitutional:  Negative for chills and fever.        Excessive daytime somnolence   HENT:  Negative for dental problem.    Respiratory:  Negative for cough and shortness of breath.    Cardiovascular:  Negative for chest pain and palpitations.   Gastrointestinal:  Negative for abdominal pain, constipation, diarrhea and nausea.   Genitourinary:  Negative for difficulty urinating.   Musculoskeletal:  Positive for arthralgias and back pain.   Neurological:  Negative for dizziness, weakness and headaches.       Objective   /71   Pulse 73   Resp 16   Ht 1.626 m (5' 4\")   Wt 125 kg (276 lb)   LMP 10/22/2020   BMI 47.38 kg/m²     Physical Exam  Cardiovascular:      Rate and Rhythm: Normal rate and regular rhythm.      Heart sounds: Normal heart sounds.   Pulmonary:      Breath sounds: Normal breath sounds.   Abdominal:      Palpations: Abdomen is soft. There is no hepatomegaly.      Tenderness: There is no abdominal tenderness.   Musculoskeletal:      Right lower leg: No edema.      Left lower leg: No edema.   Neurological:      Mental Status: She is alert and oriented to " person, place, and time.      Gait: Gait normal.   Psychiatric:         Mood and Affect: Mood normal.         Behavior: Behavior normal.         Assessment/Plan   Diagnoses and all orders for this visit:  Obstructive sleep apnea of adult  Morbid obesity (Multi)  Iron deficiency anemia secondary to inadequate dietary iron intake  Copper deficiency           Scribe Attestation  By signing my name below, IGagan Scribe   attest that this documentation has been prepared under the direction and in the presence of Lizet Khanna MD.

## 2024-08-23 LAB
A-TOCOPHEROL VIT E SERPL-MCNC: 11.6 MG/L (ref 5.5–18)
ANNOTATION COMMENT IMP: NORMAL
BETA+GAMMA TOCOPHEROL SERPL-MCNC: 2.9 MG/L (ref 0–6)
COPPER SERPL-MCNC: 132.8 UG/DL (ref 80–155)
RETINYL PALMITATE SERPL-MCNC: <0.02 MG/L (ref 0–0.1)
VIT A SERPL-MCNC: 0.59 MG/L (ref 0.3–1.2)
VIT B1 PYROPHOSHATE BLD-SCNC: 119 NMOL/L (ref 70–180)
ZINC SERPL-MCNC: 88.5 UG/DL (ref 60–120)

## 2024-08-28 ASSESSMENT — SLEEP AND FATIGUE QUESTIONNAIRES
HOW LIKELY ARE YOU TO NOD OFF OR FALL ASLEEP WHEN YOU ARE A PASSENGER IN A CAR FOR AN HOUR WITHOUT A BREAK: SLIGHT CHANCE OF DOZING
HOW LIKELY ARE YOU TO NOD OFF OR FALL ASLEEP IN A CAR, WHILE STOPPED FOR A FEW MINUTES IN TRAFFIC: NO CHANCE OF DOZING
SITING INACTIVE IN A PUBLIC PLACE LIKE A CLASS ROOM OR A MOVIE THEATER: NO CHANCE OF DOZING
HOW LIKELY ARE YOU TO NOD OFF OR FALL ASLEEP WHILE SITTING AND READING: MODERATE CHANCE OF DOZING
ESS TOTAL SCORE: 7
HOW LIKELY ARE YOU TO NOD OFF OR FALL ASLEEP WHILE SITTING AND TALKING TO SOMEONE: NO CHANCE OF DOZING
HOW LIKELY ARE YOU TO NOD OFF OR FALL ASLEEP WHILE SITTING QUIETLY AFTER LUNCH WITHOUT ALCOHOL: NO CHANCE OF DOZING
HOW LIKELY ARE YOU TO NOD OFF OR FALL ASLEEP WHILE LYING DOWN TO REST IN THE AFTERNOON WHEN CIRCUMSTANCES PERMIT: HIGH CHANCE OF DOZING
HOW LIKELY ARE YOU TO NOD OFF OR FALL ASLEEP WHILE WATCHING TV: SLIGHT CHANCE OF DOZING

## 2024-08-29 ENCOUNTER — OFFICE VISIT (OUTPATIENT)
Dept: SLEEP MEDICINE | Facility: CLINIC | Age: 44
End: 2024-08-29
Payer: COMMERCIAL

## 2024-08-29 VITALS
WEIGHT: 275.1 LBS | DIASTOLIC BLOOD PRESSURE: 69 MMHG | HEART RATE: 72 BPM | BODY MASS INDEX: 46.96 KG/M2 | HEIGHT: 64 IN | OXYGEN SATURATION: 98 % | SYSTOLIC BLOOD PRESSURE: 123 MMHG

## 2024-08-29 DIAGNOSIS — G47.33 OBSTRUCTIVE SLEEP APNEA OF ADULT: Primary | ICD-10-CM

## 2024-08-29 PROCEDURE — 1036F TOBACCO NON-USER: CPT | Performed by: NURSE PRACTITIONER

## 2024-08-29 PROCEDURE — 99214 OFFICE O/P EST MOD 30 MIN: CPT | Performed by: NURSE PRACTITIONER

## 2024-08-29 PROCEDURE — 3008F BODY MASS INDEX DOCD: CPT | Performed by: NURSE PRACTITIONER

## 2024-08-29 ASSESSMENT — SLEEP AND FATIGUE QUESTIONNAIRES
HOW LIKELY ARE YOU TO NOD OFF OR FALL ASLEEP IN A CAR, WHILE STOPPED FOR A FEW MINUTES IN TRAFFIC: WOULD NEVER DOZE
WORRIED_DISTRESSED_DUE_TO_SLEEP: A LITTLE
WAKING_TOO_EARLY: MILD
HOW LIKELY ARE YOU TO NOD OFF OR FALL ASLEEP WHILE WATCHING TV: MODERATE CHANCE OF DOZING
SATISFACTION_WITH_CURRENT_SLEEP_PATTERN: DISSATISFIED
DIFFICULTY_STAYING_ASLEEP: MODERATE
HOW LIKELY ARE YOU TO NOD OFF OR FALL ASLEEP WHILE SITTING QUIETLY AFTER LUNCH WITHOUT ALCOHOL: WOULD NEVER DOZE
DIFFICULTY_FALLING_ASLEEP: MODERATE
HOW LIKELY ARE YOU TO NOD OFF OR FALL ASLEEP WHILE LYING DOWN TO REST IN THE AFTERNOON WHEN CIRCUMSTANCES PERMIT: MODERATE CHANCE OF DOZING
SITING INACTIVE IN A PUBLIC PLACE LIKE A CLASS ROOM OR A MOVIE THEATER: WOULD NEVER DOZE
HOW LIKELY ARE YOU TO NOD OFF OR FALL ASLEEP WHILE SITTING AND READING: HIGH CHANCE OF DOZING
ESS-CHAD TOTAL SCORE: 8
SLEEP_PROBLEM_NOTICEABLE_TO_OTHERS: A LITTLE
HOW LIKELY ARE YOU TO NOD OFF OR FALL ASLEEP WHILE SITTING AND TALKING TO SOMEONE: WOULD NEVER DOZE
SLEEP_PROBLEM_INTERFERES_DAILY_ACTIVITIES: MUCH
HOW LIKELY ARE YOU TO NOD OFF OR FALL ASLEEP WHEN YOU ARE A PASSENGER IN A CAR FOR AN HOUR WITHOUT A BREAK: SLIGHT CHANCE OF DOZING

## 2024-08-29 ASSESSMENT — PAIN SCALES - GENERAL: PAINLEVEL: 0-NO PAIN

## 2024-08-29 ASSESSMENT — ENCOUNTER SYMPTOMS
OCCASIONAL FEELINGS OF UNSTEADINESS: 0
LOSS OF SENSATION IN FEET: 0
DEPRESSION: 0

## 2024-08-29 NOTE — ASSESSMENT & PLAN NOTE
Split night sleep study completed 1/28/2020 at  Sleep lab showed moderate sleep apnea with an AHI of 22.8 and SpO2 vincent of 76%. In REM, the AHI was 66. In supine sleep, the AHI was 22.8. CPAP was titrated from 4 cwp to a maximum of 10 cwp. At 9 cwp, the AHI was 3 and SpO2 vincent was 90%. Recommendation was for CPAP at 9 cwp. APAP 5-15 cwp was set up through [facility].   -Well controlled on current settings  -continue current PAP settings  -Supply order updated today  -RTC 6 mo or sooner if needed

## 2024-08-29 NOTE — ASSESSMENT & PLAN NOTE
Considering WLS  Discussed surgical recommendations for DEVANTE and bariatric surgery. She verbalized understanding

## 2024-08-29 NOTE — PATIENT INSTRUCTIONS
Instructions in Preparing for Surgery When You Have Sleep Apnea    Bring your PAP and all equipment with you to surgery.  Use your PAP daily before surgery and after surgery as soon as able.   Keep your head of the bed at 30 degrees or higher whichever is comfortable for patient.  We advise judicious use of pain medications and sedatives post-operatively as these medications can worsen sleep apnea.  We advise that you be closely monitored post-operatively due to increased risk of complications related to sleep apnea.   You are at increased but not prohibitive risk of postoperative respiratory complications due to DEVANTE.   You are optimized on PAP therapy for sleep apnea as long as you are compliant with PAP use per most recent download.  You have expressed your understanding of the above instructions and risks.

## 2024-08-29 NOTE — PROGRESS NOTES
Patient: Abhishek Marie    [id number]  : 1980 -- AGE 44 y.o.    Provider: VASHTI Dela Cruz     Location [facility]   Service Date: 2024              [facility]  Followup Visit Note      HISTORY OF PRESENT ILLNESS       HISTORY OF PRESENT ILLNESS   Abhishek Marie is a 44 y.o. female with past medical history of anxiety, dizziness, HLD, pituitary adenoma, PCOS, DEVANTE, and obesity who presents to a [facility] for followup. 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 In REM, the AHI was 66. In supine sleep, the AHI was 22.8. CPAP was titrated from 4 cwp to a maximum of 10 cwp. At 9 cwp, the AHI was 3 and SpO2 vincent was 90%. Recommendation was for CPAP at 9 cwp. APAP 5-15 cwp was set up through [facility].     CURRENT SLEEP HISTORY    On today's visit, the patient reports doing well on CPAP. Comfortable with equipment and air pressure. Getting supplies regularly. Using P30i and likes better than previous mask. She is planning for bariatric surgery hopefully by the end of the year.   Notes she is feeling perimenopausal symptoms- delayed sleep onset, hot flashes. Talked to OBGYN recently.     RLS Followup:  denies     Insomnia follow up:  Bedtime:  10:30 PM  Sleep Latency: 11 pm  Awakenings:  --  Wake time: 6:30 AM; 8 AM  Naps:   none     ESS: 8   POOL: 13  FOSQ: 28    REVIEW OF SYSTEMS     REVIEW OF SYSTEMS  Review of Systems   All other systems reviewed and are negative.      ALLERGIES AND MEDICATIONS     ALLERGIES  Allergies   Allergen Reactions    Azithromycin Rash    Clarithromycin Rash     HIVES       MEDICATIONS  Current Outpatient Medications   Medication Sig Dispense Refill    cholecalciferol (Vitamin D-3) 25 MCG (1000 UT) tablet Take 1  tablet (25 mcg) by mouth once daily.      citalopram (CeleXA) 10 mg tablet TAKE 1 TABLET BY MOUTH ONE TIME DAILY WITH CITALOPRAM 20MG. TOTAL DOSE OF 30MG. 90 tablet 3    citalopram (CeleXA) 20 mg tablet TAKE 1 TABLET BY MOUTH ONE TIME DAILY WITH CITALOPRAM 10MG TABLET. TOTAL DOSE OF 30MG. 90 tablet 3    cyanocobalamin (Vitamin B-12) 500 mcg tablet Take 1 tablet (500 mcg) by mouth once daily. 100 tablet 3    ferrous sulfate (IRON ORAL)       multivitamin tablet Multi Vitamin Daily TABS   Refills: 0       Active      spironolactone (Aldactone) 100 mg tablet Take 1 tablet (100 mg) by mouth once daily. 30 tablet 11    topiramate (Topamax) 50 mg tablet Take 1 tablet (50 mg) by mouth 2 times a day. 180 tablet 1    ZINC ORAL        No current facility-administered medications for this visit.       PPAST MEDICAL HISTORY  Past Medical History:   Diagnosis Date    Abnormal Pap smear of cervix     Allergy status to unspecified drugs, medicaments and biological substances     History of seasonal allergies    Anemia     Anxiety     Depression     Encounter for gynecological examination (general) (routine) without abnormal findings     Encounter for annual routine gynecological examination    Encounter for other general counseling and advice on contraception 03/27/2017    Counseling for birth control, oral contraceptives    Encounter for other preprocedural examination 09/03/2020    Preoperative clearance    Encounter for other specified surgical aftercare 02/22/2021    Postoperative observation    Encounter for screening for nutritional disorder     Encounter for vitamin deficiency screening    Encounter for screening for nutritional disorder 08/19/2020    Encounter for special screening examination for nutritional disorder    Endometriosis 2020    Fibroid 2020    History of prediabetes     Hyperlipidemia     Infection following a procedure, superficial incisional surgical site, initial encounter 01/25/2021    Superficial  incisional infection of surgical site    Morbid obesity (Multi)     Obesity, unspecified 09/03/2020    Obesity (BMI 30-39.9)    Other prurigo 04/23/2021    Pruritic rash    Other specified disorders of nose and nasal sinuses 03/06/2020    Sinus pressure    PCOS (polycystic ovarian syndrome)     Personal history of diseases of the blood and blood-forming organs and certain disorders involving the immune mechanism     History of anemia    Personal history of diseases of the skin and subcutaneous tissue 12/04/2018    History of pityriasis rosea    Personal history of other benign neoplasm 10/13/2020    History of uterine leiomyoma    Personal history of other diseases of the female genital tract 11/13/2020    History of abnormal uterine bleeding    Personal history of other diseases of the female genital tract 02/05/2020    History of polycystic ovarian syndrome    Personal history of other diseases of the respiratory system 02/21/2015    History of acute sinusitis    Personal history of other specified conditions 06/10/2020    History of dizziness    Personal history of other specified conditions 09/04/2013    History of nipple discharge    Persons encountering health services in other specified circumstances 07/29/2020    Encounter to establish care    Polycystic ovary syndrome 2014    Polyp of cervix uteri 09/08/2020    Cervical polyp    Prolactinoma (Multi)     Radiculopathy, lumbar region 02/23/2016    Acute lumbar radiculopathy    Right lower quadrant pain 10/15/2019    Abdominal pain, RLQ (right lower quadrant)    Sleep apnea     Snoring     Snoring    Urinary tract infection 2023    Varicella     Vestibular migraine        PAST SURGICAL HISTORY:  Past Surgical History:   Procedure Laterality Date    COLPOSCOPY      OTHER SURGICAL HISTORY  08/18/2021    Partial hysterectomy    OTHER SURGICAL HISTORY  2015    Lumbar laminectomy    OTHER SURGICAL HISTORY  10/15/2019    [place] tooth extraction    OTHER SURGICAL  "HISTORY  12/22/2016    Dental Surgery       FAMILY HISTORY  Family History   Problem Relation Name Age of Onset    Heart failure Mother Valeria     Hypertension Mother Valeria     Diabetes Mother Valeria     Diabetes Father Ramon     Diabetes Sister Esperanza     Breast cancer Maternal Grandmother Sujatha 50 - 59    Stomach cancer Maternal Grandmother Sujatha     Uterine cancer Maternal Grandmother Sujatha     Cancer Maternal Grandmother Sujatha     Alzheimer's disease Maternal Grandfather Lan     Diabetes Maternal Grandfather Lan     Stroke Maternal Grandfather Lan     Heart failure Paternal Grandmother Ramon     Vision loss Paternal Grandmother Ramon     Cancer Paternal Grandfather Ramon        FAMILY HISTORY: No changes since previous visit. Otherwise non-contributory as charted.       SOCIAL HISTORY  She  reports that she quit smoking about 17 months ago. Her smoking use included cigarettes. She started smoking about 16 years ago. She has a 15 pack-year smoking history. She has been exposed to tobacco smoke. She has never used smokeless tobacco. She reports current alcohol use. She reports that she does not use drugs.       PHYSICAL EXAM     VITAL SIGNS: /69 (BP Location: Left arm, Patient Position: Sitting, BP Cuff Size: Large adult)   Pulse 72   Ht 1.626 m (5' 4\")   Wt 125 kg (275 lb 1.6 oz)   LMP 10/22/2020   SpO2 98%   BMI 47.22 kg/m²      PREVIOUS WEIGHTS:  Wt Readings from Last 3 Encounters:   08/29/24 125 kg (275 lb 1.6 oz)   08/21/24 125 kg (276 lb)   08/21/24 125 kg (276 lb)       Physical Exam  Physical Exam   Constitutional: Alert and oriented, cooperative, no obvious distress   HEENT: Non icteric or anemic, EOM WNL bilaterally   Neck: Supple, no JVD, no goiter, no adenopathy, no rigidity  Chest: CTA bilaterally, no wheezing, crackles, rubs   Cardiac: RRR, S1 and S2, no murmur, rub, thrill   Abdomen: Obese, Soft, nontender, no masses, no organomegaly   Extremities: No clubbing, no " LL edema   Neuromuscular: Cranial nerves grossly intact, no focal deficits      RESULTS/DATA     Bicarbonate (mmol/L)   Date Value   08/20/2024 28   10/21/2023 29   07/21/2023 29   10/07/2022 28   05/24/2021 31     Iron (ug/dL)   Date Value   06/03/2024 75   10/07/2022 68   02/06/2021 60     % Saturation (%)   Date Value   06/03/2024 24 (L)     Iron Saturation (%)   Date Value   10/07/2022 21 (L)   02/06/2021 19 (L)     TIBC (ug/dL)   Date Value   06/03/2024 315   10/07/2022 321   02/06/2021 314     Ferritin   Date Value   06/03/2024 112 ng/mL   10/07/2022 95 ug/L   02/06/2021 45 ug/L       No results found for this or any previous visit from the past 365 days.         PAP Adherence:      Airsense 10- 2/26/2020    P30i medium- 8/19/2024    ASSESSMENT/PLAN     Ms. Marie is a 44 y.o. female and She returns in followup to the [facility] for DEVANTE.    Problem List and Orders  Problem List Items Addressed This Visit             ICD-10-CM    BMI 45.0-49.9, adult (Multi) Z68.42     Considering WLS  Discussed surgical recommendations for DEVANTE and bariatric surgery. She verbalized understanding           Obstructive sleep apnea of adult - Primary G47.33     Split night sleep study completed 1/28/2020 at  Sleep lab showed moderate sleep apnea with an AHI of 22.8 and SpO2 vincent of 76%. In REM, the AHI was 66. In supine sleep, the AHI was 22.8. CPAP was titrated from 4 cwp to a maximum of 10 cwp. At 9 cwp, the AHI was 3 and SpO2 vincent was 90%. Recommendation was for CPAP at 9 cwp. APAP 5-15 cwp was set up through [facility].   -Well controlled on current settings  -continue current PAP settings  -Supply order updated today  -RTC 6 mo or sooner if needed            Relevant Orders    Positive Airway Pressure (PAP) Therapy       Disposition    Return to clinic in 6 months

## 2024-09-05 PROCEDURE — RXMED WILLOW AMBULATORY MEDICATION CHARGE

## 2024-09-07 ENCOUNTER — PHARMACY VISIT (OUTPATIENT)
Dept: PHARMACY | Facility: CLINIC | Age: 44
End: 2024-09-07
Payer: COMMERCIAL

## 2024-09-16 NOTE — PROGRESS NOTES
Weight Check     Current Weight: 273 lbs / This reflects a 3 lb wt loss x 1 month.   Current BMI: 46.86    Emerald reports she is continuing to follow the lifelong rules. She reports she has quit all vaping for the past month.       Joan Keen RD, LDN  Registered Dietitian, Licensed Dietitian Nutritionist

## 2024-09-17 ENCOUNTER — APPOINTMENT (OUTPATIENT)
Dept: SURGERY | Facility: CLINIC | Age: 44
End: 2024-09-17
Payer: COMMERCIAL

## 2024-09-17 VITALS — WEIGHT: 273 LBS | BODY MASS INDEX: 46.61 KG/M2 | HEIGHT: 64 IN

## 2024-10-10 ENCOUNTER — APPOINTMENT (OUTPATIENT)
Dept: NEUROLOGY | Facility: CLINIC | Age: 44
End: 2024-10-10
Payer: COMMERCIAL

## 2024-10-17 ENCOUNTER — OFFICE VISIT (OUTPATIENT)
Dept: NEUROLOGY | Facility: CLINIC | Age: 44
End: 2024-10-17
Payer: COMMERCIAL

## 2024-10-17 VITALS
RESPIRATION RATE: 18 BRPM | BODY MASS INDEX: 47.72 KG/M2 | HEART RATE: 87 BPM | WEIGHT: 278 LBS | SYSTOLIC BLOOD PRESSURE: 134 MMHG | DIASTOLIC BLOOD PRESSURE: 83 MMHG

## 2024-10-17 DIAGNOSIS — G43.809 VESTIBULAR MIGRAINE: Primary | ICD-10-CM

## 2024-10-17 PROCEDURE — 99212 OFFICE O/P EST SF 10 MIN: CPT | Performed by: PSYCHIATRY & NEUROLOGY

## 2024-10-17 RX ORDER — TOPIRAMATE 50 MG/1
50 TABLET, FILM COATED ORAL 2 TIMES DAILY
Qty: 180 TABLET | Refills: 1 | Status: SHIPPED | OUTPATIENT
Start: 2024-10-17 | End: 2025-04-15

## 2024-10-17 ASSESSMENT — PAIN SCALES - GENERAL: PAINLEVEL_OUTOF10: 0-NO PAIN

## 2024-10-17 NOTE — PROGRESS NOTES
Follow up for likely vestibular migraines    Hx  summary from prior  notes:  Had brain MRI in 2020: improvement in size of known prolactinoma  For the prolactinoma and she follows with PCP and gets regular labs, used to follow with endocrine. Not on therapy as blood work is stable. Here b/o 10 years of intemittent dizziness, 1-2 times a month, can last for 2-3 days, things are not moving but feels like a sense of spinning forward with eyes closed. With eyes open there is more a feeling of visual discomfort along with some trouble with balance but not falls. During most of these episodes she has to stay in bed, sometimes gets nausea, sometimes with photophobia. No major headaches, last one 2 weeks ago with pain that started behind the left eye then involved the whole forehead, usually feels like pressure tension 4/10 at the most, will take tylenol which helps, can last for a few hours.   Denies syncope. Pt has had holter monitor and ENT evaluation in the past, which were unrevealing.       Last seen 4/24:  In the last 3 months since starting Topamax has not had any episode of dizziness except some brief dizziness when  getting up quickly from bending over. She tolerates Topamax and very happy with the results.     Interval hx:  On a new kind of iron supplement and on B12 for the last month, considering bariatric surgery. No new episodes of dizziness, stays hydrated.     FMH:  Grandmother has vertigo    Past Medical History:   Diagnosis Date    Allergy status to unspecified drugs, medicaments and biological substances     History of seasonal allergies    Anemia     Depression     Encounter for gynecological examination (general) (routine) without abnormal findings 09/08/2020    Encounter for annual routine gynecological examination    Encounter for gynecological examination (general) (routine) without abnormal findings 10/15/2019    Encounter for annual routine gynecological examination    Encounter for gynecological  examination (general) (routine) without abnormal findings 04/26/2016    Encounter for annual routine gynecological examination    Encounter for other general counseling and advice on contraception 03/27/2017    Counseling for birth control, oral contraceptives    Encounter for other preprocedural examination 09/03/2020    Preoperative clearance    Encounter for other specified surgical aftercare 02/22/2021    Postoperative observation    Encounter for screening for nutritional disorder     Encounter for vitamin deficiency screening    Encounter for screening for nutritional disorder 08/19/2020    Encounter for special screening examination for nutritional disorder    Infection following a procedure, superficial incisional surgical site, initial encounter 01/25/2021    Superficial incisional infection of surgical site    Obesity, unspecified 09/03/2020    Obesity (BMI 30-39.9)    Other prurigo 04/23/2021    Pruritic rash    Other specified disorders of nose and nasal sinuses 03/06/2020    Sinus pressure    Personal history of diseases of the blood and blood-forming organs and certain disorders involving the immune mechanism     History of anemia    Personal history of diseases of the skin and subcutaneous tissue 12/04/2018    History of pityriasis rosea    Personal history of other benign neoplasm 10/13/2020    History of uterine leiomyoma    Personal history of other diseases of the female genital tract 11/13/2020    History of abnormal uterine bleeding    Personal history of other diseases of the female genital tract 02/05/2020    History of polycystic ovarian syndrome    Personal history of other diseases of the respiratory system 02/21/2015    History of acute sinusitis    Personal history of other specified conditions 06/10/2020    History of dizziness    Personal history of other specified conditions 09/04/2013    History of nipple discharge    Persons encountering health services in other specified  circumstances 07/29/2020    Encounter to establish care    Polyp of cervix uteri 09/08/2020    Cervical polyp    Radiculopathy, lumbar region 02/23/2016    Acute lumbar radiculopathy    Right lower quadrant pain 10/15/2019    Abdominal pain, RLQ (right lower quadrant)    Snoring     Snoring      Allergies   Allergen Reactions    Azithromycin Rash    Clarithromycin Rash           Current Outpatient Medications:     cholecalciferol (Vitamin D-3) 25 MCG (1000 UT) tablet, Take 1 tablet (25 mcg) by mouth once daily., Disp: , Rfl:     citalopram (CeleXA) 10 mg tablet, TAKE 1 TABLET BY MOUTH ONE TIME DAILY WITH CITALOPRAM 20MG. TOTAL DOSE OF 30MG., Disp: 90 tablet, Rfl: 3    citalopram (CeleXA) 20 mg tablet, TAKE 1 TABLET BY MOUTH ONE TIME DAILY WITH CITALOPRAM 10MG TABLET. TOTAL DOSE OF 30MG., Disp: 90 tablet, Rfl: 3    cyanocobalamin (Vitamin B-12) 500 mcg tablet, Take 1 tablet (500 mcg) by mouth once daily., Disp: 100 tablet, Rfl: 3    ferrous sulfate (IRON ORAL), , Disp: , Rfl:     multivitamin tablet, Multi Vitamin Daily TABS  Refills: 0     Active, Disp: , Rfl:     spironolactone (Aldactone) 100 mg tablet, Take 1 tablet (100 mg) by mouth once daily., Disp: 30 tablet, Rfl: 11    topiramate (Topamax) 50 mg tablet, Take 1 tablet (50 mg) by mouth 2 times a day., Disp: 180 tablet, Rfl: 1    ZINC ORAL, , Disp: , Rfl:      Exam:  Normal general appearance.   The patient was alert and oriented to person, place, and time with normal language, attention and concentration, recent and remote memory. Eye movements: normal fixation, no spontaneous or gaze-evoked nystagmus, normal speed and amplitude of horizontal and vertical saccades.  Muscles of mastication and facial expression moved normally.  There was no dysarthria.  Strength 5/5 UE/LE.  Standard gait was normal.      A/P:  43 yo woman with hx of prolactinoma, not on therapy, here for a decade of intermittent episodes of dizziness/vertigo that even without major episodes of  headaches, do have some features suggestive of migraines. She had work up to r/o other etiologies. No further episodes since empirically trying topiramate at the beginning of 2024, which supports the impression of vestibular migraines. Topamax refilled, follow up in 12 months.  She has my contact info.

## 2024-10-18 NOTE — PROGRESS NOTES
Weight Check     Current Weight: 272 lbs / This reflects a 1 lb wt loss x 1 month.   Current BMI: 46.69     Emerald reports she is overall doing well. She reports having a stressful week this past month at work which lead to more snacking and vaping, however she is working to get back on track.       Joan Keen, PAOLO, LDN  Registered Dietitian, Licensed Dietitian Nutritionist

## 2024-10-22 ENCOUNTER — APPOINTMENT (OUTPATIENT)
Dept: SURGERY | Facility: CLINIC | Age: 44
End: 2024-10-22
Payer: COMMERCIAL

## 2024-10-22 ENCOUNTER — OFFICE VISIT (OUTPATIENT)
Dept: OBSTETRICS AND GYNECOLOGY | Facility: CLINIC | Age: 44
End: 2024-10-22
Payer: COMMERCIAL

## 2024-10-22 ENCOUNTER — LAB (OUTPATIENT)
Dept: LAB | Facility: LAB | Age: 44
End: 2024-10-22
Payer: COMMERCIAL

## 2024-10-22 VITALS
WEIGHT: 275 LBS | BODY MASS INDEX: 46.95 KG/M2 | SYSTOLIC BLOOD PRESSURE: 136 MMHG | DIASTOLIC BLOOD PRESSURE: 82 MMHG | HEIGHT: 64 IN

## 2024-10-22 VITALS — HEIGHT: 64 IN | WEIGHT: 272 LBS | BODY MASS INDEX: 46.44 KG/M2

## 2024-10-22 DIAGNOSIS — Z13.79 ENCOUNTER FOR OTHER SCREENING FOR GENETIC AND CHROMOSOMAL ANOMALIES: ICD-10-CM

## 2024-10-22 DIAGNOSIS — Z13.228 SCREENING FOR ENDOCRINE, METABOLIC AND IMMUNITY DISORDER: ICD-10-CM

## 2024-10-22 DIAGNOSIS — Z80.9 FAMILY HISTORY OF CANCER: ICD-10-CM

## 2024-10-22 DIAGNOSIS — Z13.0 SCREENING FOR ENDOCRINE, METABOLIC AND IMMUNITY DISORDER: ICD-10-CM

## 2024-10-22 DIAGNOSIS — N64.3 GALACTORRHEA: ICD-10-CM

## 2024-10-22 DIAGNOSIS — D35.2 PITUITARY MICROADENOMA (MULTI): ICD-10-CM

## 2024-10-22 DIAGNOSIS — Z13.29 SCREENING FOR ENDOCRINE, METABOLIC AND IMMUNITY DISORDER: ICD-10-CM

## 2024-10-22 DIAGNOSIS — Z12.31 ENCOUNTER FOR SCREENING MAMMOGRAM FOR MALIGNANT NEOPLASM OF BREAST: ICD-10-CM

## 2024-10-22 DIAGNOSIS — L68.0 HIRSUTISM: ICD-10-CM

## 2024-10-22 DIAGNOSIS — N95.1 PERIMENOPAUSAL VASOMOTOR SYMPTOMS: ICD-10-CM

## 2024-10-22 DIAGNOSIS — N95.1 PERIMENOPAUSAL VASOMOTOR SYMPTOMS: Primary | ICD-10-CM

## 2024-10-22 DIAGNOSIS — E28.2 PCOS (POLYCYSTIC OVARIAN SYNDROME): ICD-10-CM

## 2024-10-22 DIAGNOSIS — Z90.710 HISTORY OF HYSTERECTOMY: ICD-10-CM

## 2024-10-22 PROCEDURE — 36415 COLL VENOUS BLD VENIPUNCTURE: CPT

## 2024-10-22 PROCEDURE — 1036F TOBACCO NON-USER: CPT | Performed by: OBSTETRICS & GYNECOLOGY

## 2024-10-22 PROCEDURE — 99213 OFFICE O/P EST LOW 20 MIN: CPT | Performed by: OBSTETRICS & GYNECOLOGY

## 2024-10-22 PROCEDURE — 3008F BODY MASS INDEX DOCD: CPT | Performed by: OBSTETRICS & GYNECOLOGY

## 2024-10-22 RX ORDER — SPIRONOLACTONE 100 MG/1
150 TABLET, FILM COATED ORAL DAILY
Qty: 45 TABLET | Refills: 3 | Status: SHIPPED | OUTPATIENT
Start: 2024-10-22 | End: 2025-01-20

## 2024-10-22 ASSESSMENT — PATIENT HEALTH QUESTIONNAIRE - PHQ9
SUM OF ALL RESPONSES TO PHQ9 QUESTIONS 1 & 2: 0
2. FEELING DOWN, DEPRESSED OR HOPELESS: NOT AT ALL
1. LITTLE INTEREST OR PLEASURE IN DOING THINGS: NOT AT ALL

## 2024-10-22 ASSESSMENT — ENCOUNTER SYMPTOMS
OCCASIONAL FEELINGS OF UNSTEADINESS: 0
DEPRESSION: 0
LOSS OF SENSATION IN FEET: 0

## 2024-10-22 ASSESSMENT — LIFESTYLE VARIABLES
AUDIT-C TOTAL SCORE: 2
SKIP TO QUESTIONS 9-10: 1
HOW MANY STANDARD DRINKS CONTAINING ALCOHOL DO YOU HAVE ON A TYPICAL DAY: 1 OR 2
HOW OFTEN DO YOU HAVE SIX OR MORE DRINKS ON ONE OCCASION: NEVER
HOW OFTEN DO YOU HAVE A DRINK CONTAINING ALCOHOL: 2-4 TIMES A MONTH

## 2024-10-22 ASSESSMENT — SOCIAL DETERMINANTS OF HEALTH (SDOH)
WITHIN THE LAST YEAR, HAVE YOU BEEN KICKED, HIT, SLAPPED, OR OTHERWISE PHYSICALLY HURT BY YOUR PARTNER OR EX-PARTNER?: NO
WITHIN THE LAST YEAR, HAVE YOU BEEN AFRAID OF YOUR PARTNER OR EX-PARTNER?: NO
WITHIN THE LAST YEAR, HAVE TO BEEN RAPED OR FORCED TO HAVE ANY KIND OF SEXUAL ACTIVITY BY YOUR PARTNER OR EX-PARTNER?: NO
WITHIN THE LAST YEAR, HAVE YOU BEEN HUMILIATED OR EMOTIONALLY ABUSED IN OTHER WAYS BY YOUR PARTNER OR EX-PARTNER?: YES

## 2024-10-22 ASSESSMENT — PAIN SCALES - GENERAL: PAINLEVEL_OUTOF10: 2

## 2024-10-22 NOTE — PROGRESS NOTES
ESTABLISHED ANNUAL GYN VISIT     Patient Name:  Emerald Marie  :  1980  MR #:  [id number]  Acct #:  [id number]      ASSESSMENT/PLAN:   1. Perimenopausal vasomotor symptoms (Primary)    - FSH; Future  - Luteinizing Hormone; Future  - Prolactin; Future    2. Encounter for screening mammogram for malignant neoplasm of breast      3. Encounter for other screening for genetic and chromosomal anomalies    - MyRisk; Finding Something 3 - Miscellaneous Genetics Test; Future    4. Family history of cancer    - MyRisk; [employer] Genetics Test; Future    5. History of hysterectomy      6. PCOS (polycystic ovarian syndrome)      7. Screening for endocrine, metabolic and immunity disorder    - FSH; Future  - Luteinizing Hormone; Future  - Prolactin; Future    8. Galactorrhea    - FSH; Future  - Luteinizing Hormone; Future  - Prolactin; Future    9. Pituitary microadenoma (Multi)    - FSH; Future  - Luteinizing Hormone; Future  - Prolactin; Future    10. Hirsutism    -Increased spiranolactone to 150mg daily.       Counseling:  Medication education:  Education:  All new and/or current medications discussed and reviewed including side effects with patient/caregiver, Understanding:  Caregiver/Patient expressed understanding., Adherence:  Barriers to adherence identified and discussed if present,     OB/GYN Preventive:     - Pap smear indicated every 5 years if normal and otherwise low risk - Next Pap smear due Never - hysterectomy  - Self breast exam monthly and clinical breast examination yearly discussed - Next Mammogram due Now    - Screening colonoscopy recommended starting age 45, then Q3-10 years depending on testing and family history - Next screen due now preop (Bariatrics)   - Osteoporosis prevention discussion included vit d3/calcium supplements, weight-bearing exercise - DEXA scan due 65 yoa  - Genitourinary skin hygiene discussed.  - Diet/Weight management discussed.      Chief Complaint:  Annual  exam    HPI:  Emerald Marie is a 44 y.o. F  Patient's last menstrual period was 10/22/2020. for annual exam.  C/O galactorrhea, green. Hx of prolactinoma. Needs new endocrinologist.  Skin improved on Spiranolactone, breakthrough acne on face and back occasionally. 100mg daily.  On road to bariatric surgery. Failed injection therapy. Dr. Fausto Castro planning surgery.  Mild vasomotor sxs.   Hx of PCOS, but on metabolic tract for cholesterol and lower risk for diabetes.  Mother never screened for hereditary cancer gene. Maternal GDM with multiple cancers: GI and reproductive.    GYNH:   Menstrual cycles: hysterectomy  HPV Vaccine No.  Sexual activity Yes - . [place] Yes - .      Past Medical History:   Diagnosis Date    Abnormal Pap smear of cervix     Allergy status to unspecified drugs, medicaments and biological substances     History of seasonal allergies    Anemia     Anxiety     Depression     Encounter for gynecological examination (general) (routine) without abnormal findings     Encounter for annual routine gynecological examination    Encounter for other general counseling and advice on contraception 2017    Counseling for birth control, oral contraceptives    Encounter for other preprocedural examination 2020    Preoperative clearance    Encounter for other specified surgical aftercare 2021    Postoperative observation    Encounter for screening for nutritional disorder     Encounter for vitamin deficiency screening    Encounter for screening for nutritional disorder 2020    Encounter for special screening examination for nutritional disorder    Endometriosis     Fibroid     History of prediabetes     Hyperlipidemia     Infection following a procedure, superficial incisional surgical site, initial encounter 2021    Superficial incisional infection of surgical site    Morbid obesity (Multi)     Obesity, unspecified 2020    Obesity (BMI 30-39.9)    Other  prurigo 04/23/2021    Pruritic rash    Other specified disorders of nose and nasal sinuses 03/06/2020    Sinus pressure    PCOS (polycystic ovarian syndrome)     Personal history of diseases of the blood and blood-forming organs and certain disorders involving the immune mechanism     History of anemia    Personal history of diseases of the skin and subcutaneous tissue 12/04/2018    History of pityriasis rosea    Personal history of other benign neoplasm 10/13/2020    History of uterine leiomyoma    Personal history of other diseases of the female genital tract 11/13/2020    History of abnormal uterine bleeding    Personal history of other diseases of the female genital tract 02/05/2020    History of polycystic ovarian syndrome    Personal history of other diseases of the respiratory system 02/21/2015    History of acute sinusitis    Personal history of other specified conditions 06/10/2020    History of dizziness    Personal history of other specified conditions 09/04/2013    History of nipple discharge    Persons encountering health services in other specified circumstances 07/29/2020    Encounter to establish care    Polycystic ovary syndrome 2014    Polyp of cervix uteri 09/08/2020    Cervical polyp    Prolactinoma (Multi)     Radiculopathy, lumbar region 02/23/2016    Acute lumbar radiculopathy    Right lower quadrant pain 10/15/2019    Abdominal pain, RLQ (right lower quadrant)    Sleep apnea     Snoring     Snoring    Urinary tract infection 2023    Varicella     Vestibular migraine        Past Surgical History:   Procedure Laterality Date    COLPOSCOPY      MICRODISCECTOMY LUMBAR  2015    OTHER SURGICAL HISTORY  08/18/2021    Partial hysterectomy    OTHER SURGICAL HISTORY  2015    Lumbar laminectomy    OTHER SURGICAL HISTORY  10/15/2019    [place] tooth extraction    OTHER SURGICAL HISTORY  12/22/2016    Dental Surgery       Social History     Tobacco Use    Smoking status: Former     Current packs/day: 0.00      Average packs/day: 1 pack/day for 15.0 years (15.0 ttl pk-yrs)     Types: Cigarettes     Start date: 3/20/2008     Quit date: 3/20/2023     Years since quittin.5     Passive exposure: Past    Smokeless tobacco: Never   Vaping Use    Vaping status: Some Days    Substances: Nicotine    Devices: Disposable   Substance Use Topics    Alcohol use: Yes     Comment: 2-3 drinks a month    Drug use: Never        Family History   Problem Relation Name Age of Onset    Heart failure Mother Valeria     Hypertension Mother Valeria     Diabetes Mother Valeria     Diabetes Father Ramon     Diabetes Sister Esperanza     Breast cancer Maternal Grandmother Sujatha 50    Stomach cancer Maternal Grandmother Sujatha     Uterine cancer Maternal Grandmother Sujatha     Cancer Maternal Grandmother Sujatha     Alzheimer's disease Maternal Grandfather Lan     Diabetes Maternal Grandfather Lan     Stroke Maternal Grandfather Lan     Restless legs syndrome Maternal Grandfather Lan     Heart failure Paternal Grandmother Ramon     Vision loss Paternal Grandmother Ramon     Cancer Paternal Grandfather Ramon        OB History          0    Para   0    Term   0       0    AB   0    Living   0         SAB   0    IAB   0    Ectopic   0    Multiple   0    Live Births   0                  Prior to Admission medications    Medication Sig Start Date End Date Taking? Authorizing Provider   cholecalciferol (Vitamin D-3) 25 MCG (1000 UT) tablet Take 1 tablet (25 mcg) by mouth once daily.   Yes Historical Provider, MD   citalopram (CeleXA) 10 mg tablet TAKE 1 TABLET BY MOUTH ONE TIME DAILY WITH CITALOPRAM 20MG. TOTAL DOSE OF 30MG. 10/20/23 10/22/24 Yes VASHTI Shields   citalopram (CeleXA) 20 mg tablet TAKE 1 TABLET BY MOUTH ONE TIME DAILY WITH CITALOPRAM 10MG TABLET. TOTAL DOSE OF 30MG. 10/20/23 10/22/24 Yes VASHTI Shields   cyanocobalamin (Vitamin B-12) 500 mcg tablet Take 1 tablet (500 mcg) by mouth once  "daily. 6/18/24 6/18/25 Yes Lizet Khanna MD   ferrous sulfate (IRON ORAL)    Yes Historical Provider, MD   multivitamin tablet Multi Vitamin Daily TABS   Refills: 0       Active   Yes Historical Provider, MD   spironolactone (Aldactone) 100 mg tablet Take 1 tablet (100 mg) by mouth once daily. 6/13/24 6/13/25 Yes Vicenta Leigh DO   topiramate (Topamax) 50 mg tablet Take 1 tablet (50 mg) by mouth 2 times a day. 10/17/24 4/15/25 Yes Forest Ramirez MD PhD   ZINC ORAL     Historical Provider, MD   topiramate (Topamax) 50 mg tablet Take 1 tablet (50 mg) by mouth 2 times a day. 4/4/24 10/17/24  Forest Ramirez MD PhD       Allergies   Allergen Reactions    Azithromycin Rash    Clarithromycin Rash     HIVES       ROS:   [facility] - WOMEN ONLY:          Breast Lump No acute changes.  Hot Flashes no.  Painful [place] no.  Vaginal Discharge no.       [facility] - ROS Update:          Unexplained Weight Change no.  Pain anywhere in your body no.  Black or bloody stools no.  Problems with urination no.  Rashes or sores no.  Sexual problems no.  Depression or anxiety problems no.  Do you feel threatened by anyone No.      OBJECTIVE:   /82   Ht 1.626 m (5' 4\")   Wt 125 kg (275 lb)   LMP 10/22/2020   BMI 47.20 kg/m²   Body mass index is 47.2 kg/m².     Physical Exam  GENERAL:   General Appearance:  well-developed, well-nourished, no functional handicap, well-groomed.  Hygiene:  good.  Ill-appearance:  none.  Mental Status:  alert and oriented.  Speech:  clear.  Eye contact:  normal.  Appears stated age:  yes.    LUNGS:  CTAB.  Effort:  no respiratory distress.    HEART:  HRRR with S1/S2 w/o M/C/R  BREASTS: General:  no masses, no tenderness, no skin changes, no nipple abnormality, and no axillary lymphadenopathy.   ABDOMEN: Tenderness:  none.  Distention:  none.   GENITOURINARY -deferred  DERMATOLOGY:  Skin:  mild acne  EXTREMITIES: Normal:  no anomalies.  Edema:  none.    NEUROLOGICAL: Orientation:  alert and oriented " x 3  PSYCHOLOGY: Affect:  appropriate.  Mood:  pleasant.    Labs reviewed: Yes -     Imaging reviewed: Yes -       Note: This dictation was generated using Dragon voice recognition software. Please excuse any grammatical or spelling errors that may have occurred using the system.

## 2024-10-23 LAB
FSH SERPL-ACNC: 13.3 IU/L
LH SERPL-ACNC: 4.8 IU/L
PROLACTIN SERPL-MCNC: 15.2 UG/L (ref 3–20)

## 2024-10-25 LAB
COMMENTS - MP RESULT TYPE: NORMAL
SCAN RESULT: NORMAL

## 2024-11-02 ENCOUNTER — HOSPITAL ENCOUNTER (OUTPATIENT)
Dept: RADIOLOGY | Facility: HOSPITAL | Age: 44
Discharge: HOME | End: 2024-11-02
Payer: COMMERCIAL

## 2024-11-02 VITALS — HEIGHT: 64 IN | WEIGHT: 275 LBS | BODY MASS INDEX: 46.95 KG/M2

## 2024-11-02 DIAGNOSIS — Z12.31 ENCOUNTER FOR SCREENING MAMMOGRAM FOR MALIGNANT NEOPLASM OF BREAST: ICD-10-CM

## 2024-11-02 PROCEDURE — 77067 SCR MAMMO BI INCL CAD: CPT

## 2024-11-02 PROCEDURE — 77067 SCR MAMMO BI INCL CAD: CPT | Performed by: RADIOLOGY

## 2024-11-02 PROCEDURE — 77063 BREAST TOMOSYNTHESIS BI: CPT | Performed by: RADIOLOGY

## 2024-11-21 ENCOUNTER — TELEPHONE (OUTPATIENT)
Dept: OBSTETRICS AND GYNECOLOGY | Facility: CLINIC | Age: 44
End: 2024-11-21
Payer: COMMERCIAL

## 2024-11-21 NOTE — TELEPHONE ENCOUNTER
[facility] My Risk Cancer Screen results put in envelope up front. I left a detailed message for her.

## 2024-12-13 ENCOUNTER — HOSPITAL ENCOUNTER (OUTPATIENT)
Facility: HOSPITAL | Age: 44
Setting detail: SURGERY ADMIT
End: 2024-12-13
Attending: SURGERY | Admitting: SURGERY
Payer: COMMERCIAL

## 2024-12-13 DIAGNOSIS — E66.01 MORBID OBESITY (MULTI): ICD-10-CM

## 2025-01-15 ENCOUNTER — APPOINTMENT (OUTPATIENT)
Dept: SURGERY | Facility: CLINIC | Age: 45
End: 2025-01-15
Payer: COMMERCIAL

## 2025-01-22 ENCOUNTER — TELEPHONE (OUTPATIENT)
Dept: SURGERY | Facility: HOSPITAL | Age: 45
End: 2025-01-22
Payer: COMMERCIAL

## 2025-01-24 ENCOUNTER — TELEPHONE (OUTPATIENT)
Dept: SURGERY | Facility: CLINIC | Age: 45
End: 2025-01-24
Payer: COMMERCIAL

## 2025-01-24 NOTE — TELEPHONE ENCOUNTER
Left message to get scheduled with one of the [facility] surgeons for a touch base and go over clearances need to get cleared for surgery.

## 2025-01-27 ENCOUNTER — TELEPHONE (OUTPATIENT)
Dept: SURGERY | Facility: HOSPITAL | Age: 45
End: 2025-01-27
Payer: COMMERCIAL

## 2025-01-27 NOTE — TELEPHONE ENCOUNTER
I called Emerald to inform her that I have given my notice to  and will not be performing surgery on her.  I apologized to her for any inconvenience this may cause but explained that I feel it would be inappropriate for me to perform surgery and not be involved in postoperative management.  I have explained that  will be reaching out to Emerald to arrange for an appointment with another bariatric surgeon.  The surgeon will assume all responsibility for Emerald's care including informed consent, appropriateness for surgery, decision to perform surgery, intraoperative decision making.  Additionally, postoperative management will be the responsibility of the assigned surgeon with consent from Emerald.

## 2025-01-28 ENCOUNTER — TELEPHONE (OUTPATIENT)
Dept: SURGERY | Facility: CLINIC | Age: 45
End: 2025-01-28
Payer: COMMERCIAL

## 2025-01-28 DIAGNOSIS — Z98.84 BARIATRIC SURGERY STATUS: ICD-10-CM

## 2025-01-28 NOTE — TELEPHONE ENCOUNTER
Spoke with Emerald in regards to clearances.  She was actually going to postpone surgery as she has some home life issues she needs to attend to and didn't want surgery to interfere.  For now I am sending nicotine lab, and cardiology referral.  Emerald is going to make an appt with Cardiology for clearance and do her lab work.  Once completed I will send to surgery scheduling to get her scheduled with Dr. Villarreal for bariatric surgery.     Subjective:    Here to followup after tonsillectomy    Patient ID: Josette Doss is a 13 y.o. female.    Chief Complaint:  Recent tonsillectomy 7/31/2017     Josette Doss is a 13 y.o. female here to see me today after a recent tonsillectomy.   Following surgery, she is doing quite well at this time.  She experienced pain for 5 days, but is no longer requiring any oral pain medicine.  She has resumed a regular diet and all normal activities.  She did not experience any postoperative bleeding or other complications.  They have no specific questions or concerns today.  She was recently seen at  for otitis externa and is being treated with Ciprodex and Amoxil.  No fever in past 48 hours.    Review of Systems   Constitutional: Negative for fever and fatigue.   HENT: Positive for ear pain (improved).  Negative for mouth sores, sore throat, trouble swallowing and voice change.    Respiratory: Negative for cough.    Cardiovascular: Negative for chest pain.   Neurological: Negative for headaches.       Objective:     Physical Exam   Constitutional: She appears well-developed and well-nourished.   HENT:   Head: Normocephalic.   Right Ear: Tympanic membrane and external ear are normal.  Mild edema of the ear canal.  Tympanic membrane is not erythematous.   Left Ear: Tympanic membrane, external ear and ear canal normal. Tympanic membrane is not erythematous.   Nose: Nose normal. No rhinorrhea.   Mouth/Throat: Uvula is midline and oropharynx is clear and moist. No trismus in the jaw. Normal dentition. No uvula swelling.   Status post tonsillectomy, tonsillar fossae healing well   Lymphadenopathy:     She has no cervical adenopathy.       Assessment:     1. Tonsillar hypertrophy    2. Otitis externa of right ear, unspecified chronicity, unspecified type        Plan:        1. Tonsillar hypertrophy    2. Otitis externa of right ear, unspecified chronicity, unspecified type    :    Now status post tonsillectomy and doing  well.  No further treatment needed at this time.   Recommend she complete Ciprodex and Amoxil as prescribed.  Advised her to avoid swimming until ear infection resolves.  Discussed need for dry ear precautions.  RTC if ear pain worsens despite antibiotic therapy, otherwise only as needed.   was present today with mother throughout this visit.

## 2025-02-13 ENCOUNTER — OFFICE VISIT (OUTPATIENT)
Dept: NEUROSURGERY | Facility: CLINIC | Age: 45
End: 2025-02-13
Payer: COMMERCIAL

## 2025-02-13 VITALS
WEIGHT: 275 LBS | BODY MASS INDEX: 46.95 KG/M2 | DIASTOLIC BLOOD PRESSURE: 83 MMHG | RESPIRATION RATE: 18 BRPM | HEIGHT: 64 IN | HEART RATE: 85 BPM | SYSTOLIC BLOOD PRESSURE: 142 MMHG

## 2025-02-13 DIAGNOSIS — D35.2 PROLACTINOMA (MULTI): Primary | ICD-10-CM

## 2025-02-13 PROCEDURE — 99212 OFFICE O/P EST SF 10 MIN: CPT | Performed by: NURSE PRACTITIONER

## 2025-02-13 ASSESSMENT — PAIN SCALES - GENERAL: PAINLEVEL_OUTOF10: 0-NO PAIN

## 2025-02-13 NOTE — PROGRESS NOTES
"[facility]  Neurosurgery    History of Present Illness      Emerald Marie is a 44-year-old female with a PMH significant for PCOS, prolactinoma (dx 12 years ago), prediabetes, who presented to her OBGYN with complaints of galactorrhea. Her last MRI was completed in 2020 at which time was noted to have fluid-filled cleft hypoenhancing lesion that had decreased in size measuring 3x4mm previously 6x6mm in 2014. Previously following with endocrinology on cabergoline which she is no longer taking. Patient was referred to neurosurgery for adenoma management. She is following with neurology for vestibular migraines recommended for treatment with Topamax. Patient presents to clinic for NPV.     No recent ophthalmology evaluation. Not currently following with endocrinology. Fluctuates between hot flashes / cold intolerance - perimenopausal. Continued galactorrhea a few times a month. Shoe size has increased to a 10.5 from 10 in the last year. Intermittent headaches and will take tylenol as needed. These are infrequent for her. Does have dizziness and was being evaluated by neurology for vestibular headaches. No recent falls or history of seizure.               Objective      Vitals:   /83   Pulse 85   Resp 18   Ht 1.626 m (5' 4\")   Wt 125 kg (275 lb)   LMP 10/22/2020   BMI 47.20 kg/m²         Physical Exam:    A&Ox3   Fluent speech   EOMI; FC x 4   DOMINGUEZ; strength 5/5; no drift   Face and shoulder shrug symmetrical   SILT   Tongue midline   No focal motor deficits on exam   Gait normal          Relevant Results:    Labs 10/22/24:   LH 4.8   Prolactin: 15.2   FSH: 13.3     No new imaging for review         Assessment & Plan      Diagnosis:  Emerald was seen today for pituitary adenoma.  Diagnoses and all orders for this visit:  Prolactinoma (Multi)  -     Referral to Ophthalmology; Future  -     Referral to Endocrinology; Future  -     MR sella w and wo IV contrast  -     Creatinine; Future  -     " Creatinine  -     TSH with reflex to Free T4 if abnormal; Future  -     Prolactin; Future  -     DHEA-Sulfate; Future  -     FSH & LH; Future  -     Growth Hormone; Future  -     Insulin-Like Growth Factor 1; Future  -     Cortisol; Future  -     Adrenocorticotropic Hormone (ACTH); Future  -     TSH with reflex to Free T4 if abnormal  -     Prolactin  -     DHEA-Sulfate  -     FSH & LH  -     Growth Hormone  -     Insulin-Like Growth Factor 1  -     Cortisol  -     Adrenocorticotropic Hormone (ACTH)          Provider Impression:   Patient is a 44-year-old female presenting for initial evaluation for known history of prolactinoma that was diagnosed approximately 12 years ago.  No new imaging for review her last MRI was completed in 2020.  Previously following with endocrinology treated with cabergoline.  He is not currently following with ophthalmology.  Patient endorsed galactorrhea that is intermittent.  He is also noted this an increase in shoe size from 10-10-1/2.    - MRI sella w/wo due now   - Endocrinology referral  - Ophthalmology referral for full visual testing  -Will update pituitary labs    Plan discussed with patient who is in agreement.  All questions answered.        Medical History     Past Medical History:   Diagnosis Date    Abnormal Pap smear of cervix     Allergy status to unspecified drugs, medicaments and biological substances     History of seasonal allergies    Anemia     Anxiety     Depression     Encounter for gynecological examination (general) (routine) without abnormal findings     Encounter for annual routine gynecological examination    Encounter for other general counseling and advice on contraception 03/27/2017    Counseling for birth control, oral contraceptives    Encounter for other preprocedural examination 09/03/2020    Preoperative clearance    Encounter for other specified surgical aftercare 02/22/2021    Postoperative observation    Encounter for screening for nutritional  disorder     Encounter for vitamin deficiency screening    Encounter for screening for nutritional disorder 08/19/2020    Encounter for special screening examination for nutritional disorder    Endometriosis 2020    Fibroid 2020    History of prediabetes     Hyperlipidemia     Infection following a procedure, superficial incisional surgical site, initial encounter 01/25/2021    Superficial incisional infection of surgical site    Morbid obesity (Multi)     Obesity, unspecified 09/03/2020    Obesity (BMI 30-39.9)    Other prurigo 04/23/2021    Pruritic rash    Other specified disorders of nose and nasal sinuses 03/06/2020    Sinus pressure    PCOS (polycystic ovarian syndrome)     Personal history of diseases of the blood and blood-forming organs and certain disorders involving the immune mechanism     History of anemia    Personal history of diseases of the skin and subcutaneous tissue 12/04/2018    History of pityriasis rosea    Personal history of other benign neoplasm 10/13/2020    History of uterine leiomyoma    Personal history of other diseases of the female genital tract 11/13/2020    History of abnormal uterine bleeding    Personal history of other diseases of the female genital tract 02/05/2020    History of polycystic ovarian syndrome    Personal history of other diseases of the respiratory system 02/21/2015    History of acute sinusitis    Personal history of other specified conditions 06/10/2020    History of dizziness    Personal history of other specified conditions 09/04/2013    History of nipple discharge    Persons encountering health services in other specified circumstances 07/29/2020    Encounter to establish care    Polycystic ovary syndrome 2014    Polyp of cervix uteri 09/08/2020    Cervical polyp    Prolactinoma (Multi)     Radiculopathy, lumbar region 02/23/2016    Acute lumbar radiculopathy    Right lower quadrant pain 10/15/2019    Abdominal pain, RLQ (right lower quadrant)    Sleep apnea      Snoring     Snoring    Urinary tract infection     Varicella     Vestibular migraine      Past Surgical History:   Procedure Laterality Date    COLPOSCOPY      MICRODISCECTOMY LUMBAR  2015    OTHER SURGICAL HISTORY  2021    Partial hysterectomy    OTHER SURGICAL HISTORY  2015    Lumbar laminectomy    OTHER SURGICAL HISTORY  10/15/2019    [place] tooth extraction    OTHER SURGICAL HISTORY  2016    Dental Surgery     Social History     Tobacco Use    Smoking status: Former     Current packs/day: 0.00     Average packs/day: 1 pack/day for 15.0 years (15.0 ttl pk-yrs)     Types: Cigarettes     Start date: 3/20/2008     Quit date: 3/20/2023     Years since quittin.9     Passive exposure: Past    Smokeless tobacco: Current    Tobacco comments:     vapes   Vaping Use    Vaping status: Some Days    Substances: Nicotine    Devices: Disposable   Substance Use Topics    Alcohol use: Yes     Comment: 2-3 drinks a month    Drug use: Never     Family History   Problem Relation Name Age of Onset    Heart failure Mother Valeria     Hypertension Mother Valeria     Diabetes Mother Valeria     Diabetes Father Ramon     Diabetes Sister Esperanza     Breast cancer Maternal Grandmother Sujatha 50    Stomach cancer Maternal Grandmother Sujatha     Uterine cancer Maternal Grandmother Sujatha     Cancer Maternal Grandmother Sujatha     Alzheimer's disease Maternal Grandfather Lan     Diabetes Maternal Grandfather Lan     Stroke Maternal Grandfather Lan     Restless legs syndrome Maternal Grandfather Lan     Heart failure Paternal Grandmother Ramon     Vision loss Paternal Grandmother Ramon     Cancer Paternal Grandfather Ramon      Allergies   Allergen Reactions    Azithromycin Rash    Clarithromycin Rash     HIVES     Current Outpatient Medications   Medication Instructions    cholecalciferol (VITAMIN D-3) 25 mcg, Daily    citalopram (CeleXA) 10 mg tablet TAKE 1 TABLET BY MOUTH ONE TIME DAILY WITH  CITALOPRAM 20MG. TOTAL DOSE OF 30MG.    citalopram (CeleXA) 20 mg tablet TAKE 1 TABLET BY MOUTH ONE TIME DAILY WITH CITALOPRAM 10MG TABLET. TOTAL DOSE OF 30MG.    cyanocobalamin (VITAMIN B-12) 500 mcg, oral, Daily    ferrous sulfate (IRON ORAL) No dose, route, or frequency recorded.    multivitamin tablet Multi Vitamin Daily TABS   Refills: 0       Active    spironolactone (ALDACTONE) 150 mg, oral, Daily    topiramate (TOPAMAX) 50 mg, oral, 2 times daily    ZINC ORAL No dose, route, or frequency recorded.

## 2025-02-19 LAB
ACTH PLAS-MCNC: 23 PG/ML (ref 6–50)
CORTIS SERPL-MCNC: NORMAL UG/DL
CREAT SERPL-MCNC: 0.78 MG/DL (ref 0.5–0.99)
DHEA-S SERPL-MCNC: 278 MCG/DL (ref 15–205)
EGFRCR SERPLBLD CKD-EPI 2021: 97 ML/MIN/1.73M2
FSH SERPL-ACNC: 9.5 MIU/ML
GH SERPL-MCNC: 0.1 NG/ML
IGF-I SERPL-MCNC: 150 NG/ML (ref 52–328)
IGF-I Z-SCORE SERPL: 0.1 SD
LH SERPL-ACNC: 2.5 MIU/ML
PROLACTIN SERPL-MCNC: 14.2 NG/ML
TSH SERPL-ACNC: 1.43 MIU/L

## 2025-02-24 ENCOUNTER — APPOINTMENT (OUTPATIENT)
Dept: SURGERY | Facility: CLINIC | Age: 45
End: 2025-02-24
Payer: COMMERCIAL

## 2025-02-24 LAB
ACTH PLAS-MCNC: 23 PG/ML (ref 6–50)
CORTIS SERPL-MCNC: 6.4 MCG/DL
CREAT SERPL-MCNC: 0.78 MG/DL (ref 0.5–0.99)
DHEA-S SERPL-MCNC: 278 MCG/DL (ref 15–205)
EGFRCR SERPLBLD CKD-EPI 2021: 97 ML/MIN/1.73M2
FSH SERPL-ACNC: 9.5 MIU/ML
GH SERPL-MCNC: 0.1 NG/ML
IGF-I SERPL-MCNC: 150 NG/ML (ref 52–328)
IGF-I Z-SCORE SERPL: 0.1 SD
LH SERPL-ACNC: 2.5 MIU/ML
PROLACTIN SERPL-MCNC: 14.2 NG/ML
TSH SERPL-ACNC: 1.43 MIU/L

## 2025-02-25 PROCEDURE — RXMED WILLOW AMBULATORY MEDICATION CHARGE

## 2025-02-27 ENCOUNTER — PHARMACY VISIT (OUTPATIENT)
Dept: PHARMACY | Facility: CLINIC | Age: 45
End: 2025-02-27
Payer: COMMERCIAL
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
## 2025-03-07 ENCOUNTER — OFFICE VISIT (OUTPATIENT)
Dept: CARDIOLOGY | Facility: CLINIC | Age: 45
End: 2025-03-07
Payer: COMMERCIAL

## 2025-03-07 VITALS
SYSTOLIC BLOOD PRESSURE: 149 MMHG | BODY MASS INDEX: 47.12 KG/M2 | DIASTOLIC BLOOD PRESSURE: 84 MMHG | HEART RATE: 72 BPM | HEIGHT: 64 IN | OXYGEN SATURATION: 97 % | WEIGHT: 276 LBS

## 2025-03-07 DIAGNOSIS — Z01.818 PRE-OPERATIVE CLEARANCE: Primary | ICD-10-CM

## 2025-03-07 DIAGNOSIS — Z98.84 BARIATRIC SURGERY STATUS: ICD-10-CM

## 2025-03-07 PROCEDURE — 93005 ELECTROCARDIOGRAM TRACING: CPT | Performed by: NURSE PRACTITIONER

## 2025-03-07 PROCEDURE — 99203 OFFICE O/P NEW LOW 30 MIN: CPT | Performed by: NURSE PRACTITIONER

## 2025-03-07 PROCEDURE — 3008F BODY MASS INDEX DOCD: CPT | Performed by: NURSE PRACTITIONER

## 2025-03-07 PROCEDURE — 99213 OFFICE O/P EST LOW 20 MIN: CPT | Performed by: NURSE PRACTITIONER

## 2025-03-07 ASSESSMENT — PATIENT HEALTH QUESTIONNAIRE - PHQ9
1. LITTLE INTEREST OR PLEASURE IN DOING THINGS: NOT AT ALL
2. FEELING DOWN, DEPRESSED OR HOPELESS: NOT AT ALL
SUM OF ALL RESPONSES TO PHQ9 QUESTIONS 1 AND 2: 0

## 2025-03-07 ASSESSMENT — ENCOUNTER SYMPTOMS: DIZZINESS: 1

## 2025-03-10 ENCOUNTER — APPOINTMENT (OUTPATIENT)
Dept: SURGERY | Facility: CLINIC | Age: 45
End: 2025-03-10
Payer: COMMERCIAL

## 2025-03-11 ENCOUNTER — HOSPITAL ENCOUNTER (OUTPATIENT)
Dept: RADIOLOGY | Facility: CLINIC | Age: 45
Discharge: HOME | End: 2025-03-11
Payer: COMMERCIAL

## 2025-03-11 PROCEDURE — 2550000001 HC RX 255 CONTRASTS: Performed by: NURSE PRACTITIONER

## 2025-03-11 PROCEDURE — A9575 INJ GADOTERATE MEGLUMI 0.1ML: HCPCS | Performed by: NURSE PRACTITIONER

## 2025-03-11 PROCEDURE — 70553 MRI BRAIN STEM W/O & W/DYE: CPT

## 2025-03-11 RX ORDER — GADOTERATE MEGLUMINE 376.9 MG/ML
0.2 INJECTION INTRAVENOUS
Status: COMPLETED | OUTPATIENT
Start: 2025-03-11 | End: 2025-03-11

## 2025-03-11 RX ADMIN — GADOTERATE MEGLUMINE 25 ML: 376.9 INJECTION INTRAVENOUS at 15:38

## 2025-03-14 LAB
ATRIAL RATE: 71 BPM
P AXIS: 35 DEGREES
P OFFSET: 201 MS
P ONSET: 145 MS
PR INTERVAL: 152 MS
Q ONSET: 221 MS
QRS COUNT: 11 BEATS
QRS DURATION: 80 MS
QT INTERVAL: 398 MS
QTC CALCULATION(BAZETT): 432 MS
QTC FREDERICIA: 421 MS
R AXIS: 60 DEGREES
T AXIS: 47 DEGREES
T OFFSET: 420 MS
VENTRICULAR RATE: 71 BPM

## 2025-03-17 DIAGNOSIS — K11.9 LESION OF PAROTID GLAND: ICD-10-CM

## 2025-03-17 DIAGNOSIS — D35.2 PROLACTINOMA (MULTI): Primary | ICD-10-CM

## 2025-03-24 ENCOUNTER — APPOINTMENT (OUTPATIENT)
Dept: SURGERY | Facility: CLINIC | Age: 45
End: 2025-03-24
Payer: COMMERCIAL

## 2025-04-18 ENCOUNTER — APPOINTMENT (OUTPATIENT)
Dept: OTOLARYNGOLOGY | Facility: CLINIC | Age: 45
End: 2025-04-18
Payer: COMMERCIAL

## 2025-04-18 DIAGNOSIS — K11.9 LESION OF PAROTID GLAND: ICD-10-CM

## 2025-04-18 PROCEDURE — 99244 OFF/OP CNSLTJ NEW/EST MOD 40: CPT | Performed by: OTOLARYNGOLOGY

## 2025-04-18 NOTE — H&P (VIEW-ONLY)
ENT Head and Neck Surgery New Patient Visit     History Of Present Illness  Emerald Marie is a 44 y.o. female with history of prolactinoma and PCOS presenting with parotid lesion.  Had an MRI of her pituitary gland recently which showed a 14 mm mass in her right parotid.  On further review MRI from 2020 showed a 9 mm right parotid mass. She has some pain in her right jaw intermittently that she attributes to clenching at night, otherwise asymptomatic.      Past Medical History  She has a past medical history of Abnormal Pap smear of cervix, Allergy status to unspecified drugs, medicaments and biological substances, Anemia, Anxiety, Depression, Encounter for gynecological examination (general) (routine) without abnormal findings, Encounter for other general counseling and advice on contraception (03/27/2017), Encounter for other preprocedural examination (09/03/2020), Encounter for other specified surgical aftercare (02/22/2021), Encounter for screening for nutritional disorder, Encounter for screening for nutritional disorder (08/19/2020), Endometriosis (2020), Fibroid (2020), History of prediabetes, Hyperlipidemia, Infection following a procedure, superficial incisional surgical site, initial encounter (01/25/2021), Morbid obesity (Multi), Obesity, unspecified (09/03/2020), Other prurigo (04/23/2021), Other specified disorders of nose and nasal sinuses (03/06/2020), PCOS (polycystic ovarian syndrome), Personal history of diseases of the blood and blood-forming organs and certain disorders involving the immune mechanism, Personal history of diseases of the skin and subcutaneous tissue (12/04/2018), Personal history of other benign neoplasm (10/13/2020), Personal history of other diseases of the female genital tract (11/13/2020), Personal history of other diseases of the female genital tract (02/05/2020), Personal history of other diseases of the respiratory system (02/21/2015), Personal history of other  specified conditions (06/10/2020), Personal history of other specified conditions (09/04/2013), Persons encountering health services in other specified circumstances (07/29/2020), Polycystic ovary syndrome (2014), Polyp of cervix uteri (09/08/2020), Prolactinoma (Multi), Radiculopathy, lumbar region (02/23/2016), Right lower quadrant pain (10/15/2019), Sleep apnea, Snoring, Urinary tract infection (2023), Varicella, and Vestibular migraine.    Surgical History  She has a past surgical history that includes Other surgical history (08/18/2021); Other surgical history (2015); Other surgical history (10/15/2019); Other surgical history (12/22/2016); Colposcopy; and Microdiscectomy lumbar (2015).     Social History  She reports that she quit smoking about 2 years ago. Her smoking use included cigarettes. She started smoking about 17 years ago. She has a 15 pack-year smoking history. She has been exposed to tobacco smoke. She uses smokeless tobacco. She reports current alcohol use. She reports current drug use. Drug: Marijuana.    Family History  Family History[1]     Allergies  Azithromycin and Clarithromycin    Review of Systems  Negative except per HPI     Physical Exam  CONSTITUTIONAL:  No acute distress  VOICE:  No hoarseness or other abnormality  RESPIRATION:  Breathing comfortably, no stridor  CV:  No clubbing/cyanosis/edema in hands  EYES:  EOM intact, sclera normal  NEURO:  Alert and oriented times 3, Cranial nerves II-XII grossly intact and symmetric bilaterally. Facial nerve intact and symmetric  HEAD AND FACE:  Symmetric facial features, no masses or lesions, sinuses non-tender to palpation  SALIVARY GLANDS:  Parotid and submandibular glands normal bilaterally,  no palpable lesion, nontender  EARS:  Normal external ears  NOSE:  External nose midline  ORAL CAVITY/OROPHARYNX/LIPS:  Normal mucous membranes, normal floor of mouth/tongue/OP, no masses or lesions  PHARYNGEAL WALLS:  No masses or lesions  NECK/LYMPH:   No LAD, no thyroid masses, trachea midline  SKIN:  Neck skin is without scar or injury  PSYCH:  Alert and oriented with appropriate mood and affect       Last Recorded Vitals  Last menstrual period 10/22/2020.    Relevant Results      MRI pituitary from 3/11/25 reviewed which showed lobulated well circumscribed 14 mm lesion in right parotid gland adjacent to mandibular ramus. Interval growth from 9 mm in MRI from 2020     Assessment/Plan   44 y.o. presenting with 14 mm parotid lesion incidentally found on imaging      - US guided biopsy   - discussed recommendation for surgical excision, timing dependent on nature of mass. Discussed that about 80% of parotid lesions are benign but even benign lesions can grow and have a small rate of malignant transformation so I do recommend surgical excision. Will call her with biopsy results and then discuss timing further at that time           The above resident note has been reviewed and confirmed.  Patient was seen and examined with the resident today.  Documentation has been reviewed.              Lengthy and detailed discussion was held with patient regarding parotidectomy including risk benefits and alternatives  including risks to the facial nerve was discussed. This includes partial or complete, temporary or permanent facial paralysis and the implications of each. They understand surgical incisions, numbness of the region, disfigurement, sialocele, salivary fistula, Catina's syndrome and perioperative risks given any associated medical comorbidities. Length of surgery, expected outcomes have all reviewed in detail option reviewed.  Verbal consent was obtained             Will call her with the result of the biopsy         [1]   Family History  Problem Relation Name Age of Onset    Heart failure Mother Vaelria     Hypertension Mother Valeria     Diabetes Mother Valeria     Diabetes Father Ramon     Diabetes Sister Esperanza     Breast cancer Maternal Grandmother Sujatha 50     Stomach cancer Maternal Grandmother Sujatha     Uterine cancer Maternal Grandmother Sujatha     Cancer Maternal Grandmother Sujatha     Alzheimer's disease Maternal Grandfather Lan     Diabetes Maternal Grandfather Lan     Stroke Maternal Grandfather Lan     Restless legs syndrome Maternal Grandfather Lan     Heart failure Paternal Grandmother Ramon     Vision loss Paternal Grandmother Ramon     Cancer Paternal Grandfather Ramon

## 2025-04-18 NOTE — PROGRESS NOTES
ENT Head and Neck Surgery New Patient Visit     History Of Present Illness  Emerald Marie is a 44 y.o. female with history of prolactinoma and PCOS presenting with parotid lesion.  Had an MRI of her pituitary gland recently which showed a 14 mm mass in her right parotid.  On further review MRI from 2020 showed a 9 mm right parotid mass. She has some pain in her right jaw intermittently that she attributes to clenching at night, otherwise asymptomatic.      Past Medical History  She has a past medical history of Abnormal Pap smear of cervix, Allergy status to unspecified drugs, medicaments and biological substances, Anemia, Anxiety, Depression, Encounter for gynecological examination (general) (routine) without abnormal findings, Encounter for other general counseling and advice on contraception (03/27/2017), Encounter for other preprocedural examination (09/03/2020), Encounter for other specified surgical aftercare (02/22/2021), Encounter for screening for nutritional disorder, Encounter for screening for nutritional disorder (08/19/2020), Endometriosis (2020), Fibroid (2020), History of prediabetes, Hyperlipidemia, Infection following a procedure, superficial incisional surgical site, initial encounter (01/25/2021), Morbid obesity (Multi), Obesity, unspecified (09/03/2020), Other prurigo (04/23/2021), Other specified disorders of nose and nasal sinuses (03/06/2020), PCOS (polycystic ovarian syndrome), Personal history of diseases of the blood and blood-forming organs and certain disorders involving the immune mechanism, Personal history of diseases of the skin and subcutaneous tissue (12/04/2018), Personal history of other benign neoplasm (10/13/2020), Personal history of other diseases of the female genital tract (11/13/2020), Personal history of other diseases of the female genital tract (02/05/2020), Personal history of other diseases of the respiratory system (02/21/2015), Personal history of other  specified conditions (06/10/2020), Personal history of other specified conditions (09/04/2013), Persons encountering health services in other specified circumstances (07/29/2020), Polycystic ovary syndrome (2014), Polyp of cervix uteri (09/08/2020), Prolactinoma (Multi), Radiculopathy, lumbar region (02/23/2016), Right lower quadrant pain (10/15/2019), Sleep apnea, Snoring, Urinary tract infection (2023), Varicella, and Vestibular migraine.    Surgical History  She has a past surgical history that includes Other surgical history (08/18/2021); Other surgical history (2015); Other surgical history (10/15/2019); Other surgical history (12/22/2016); Colposcopy; and Microdiscectomy lumbar (2015).     Social History  She reports that she quit smoking about 2 years ago. Her smoking use included cigarettes. She started smoking about 17 years ago. She has a 15 pack-year smoking history. She has been exposed to tobacco smoke. She uses smokeless tobacco. She reports current alcohol use. She reports current drug use. Drug: Marijuana.    Family History  Family History[1]     Allergies  Azithromycin and Clarithromycin    Review of Systems  Negative except per HPI     Physical Exam  CONSTITUTIONAL:  No acute distress  VOICE:  No hoarseness or other abnormality  RESPIRATION:  Breathing comfortably, no stridor  CV:  No clubbing/cyanosis/edema in hands  EYES:  EOM intact, sclera normal  NEURO:  Alert and oriented times 3, Cranial nerves II-XII grossly intact and symmetric bilaterally. Facial nerve intact and symmetric  HEAD AND FACE:  Symmetric facial features, no masses or lesions, sinuses non-tender to palpation  SALIVARY GLANDS:  Parotid and submandibular glands normal bilaterally,  no palpable lesion, nontender  EARS:  Normal external ears  NOSE:  External nose midline  ORAL CAVITY/OROPHARYNX/LIPS:  Normal mucous membranes, normal floor of mouth/tongue/OP, no masses or lesions  PHARYNGEAL WALLS:  No masses or lesions  NECK/LYMPH:   No LAD, no thyroid masses, trachea midline  SKIN:  Neck skin is without scar or injury  PSYCH:  Alert and oriented with appropriate mood and affect       Last Recorded Vitals  Last menstrual period 10/22/2020.    Relevant Results      MRI pituitary from 3/11/25 reviewed which showed lobulated well circumscribed 14 mm lesion in right parotid gland adjacent to mandibular ramus. Interval growth from 9 mm in MRI from 2020     Assessment/Plan   44 y.o. presenting with 14 mm parotid lesion incidentally found on imaging      - US guided biopsy   - discussed recommendation for surgical excision, timing dependent on nature of mass. Discussed that about 80% of parotid lesions are benign but even benign lesions can grow and have a small rate of malignant transformation so I do recommend surgical excision. Will call her with biopsy results and then discuss timing further at that time           The above resident note has been reviewed and confirmed.  Patient was seen and examined with the resident today.  Documentation has been reviewed.              Lengthy and detailed discussion was held with patient regarding parotidectomy including risk benefits and alternatives  including risks to the facial nerve was discussed. This includes partial or complete, temporary or permanent facial paralysis and the implications of each. They understand surgical incisions, numbness of the region, disfigurement, sialocele, salivary fistula, Catina's syndrome and perioperative risks given any associated medical comorbidities. Length of surgery, expected outcomes have all reviewed in detail option reviewed.  Verbal consent was obtained             Will call her with the result of the biopsy         [1]   Family History  Problem Relation Name Age of Onset    Heart failure Mother Valeria     Hypertension Mother Valeria     Diabetes Mother Valeria     Diabetes Father Ramon     Diabetes Sister Esperanza     Breast cancer Maternal Grandmother Sujatha 50     Stomach cancer Maternal Grandmother Sujatha     Uterine cancer Maternal Grandmother Sujatha     Cancer Maternal Grandmother Sujatha     Alzheimer's disease Maternal Grandfather Lan     Diabetes Maternal Grandfather Lan     Stroke Maternal Grandfather Lan     Restless legs syndrome Maternal Grandfather Lan     Heart failure Paternal Grandmother Ramon     Vision loss Paternal Grandmother Ramon     Cancer Paternal Grandfather Ramon

## 2025-04-29 ENCOUNTER — APPOINTMENT (OUTPATIENT)
Dept: OPHTHALMOLOGY | Facility: CLINIC | Age: 45
End: 2025-04-29
Payer: COMMERCIAL

## 2025-04-29 DIAGNOSIS — D35.2 PROLACTINOMA (MULTI): ICD-10-CM

## 2025-04-29 PROCEDURE — 92083 EXTENDED VISUAL FIELD XM: CPT

## 2025-04-29 PROCEDURE — 92134 CPTRZ OPH DX IMG PST SGM RTA: CPT

## 2025-04-29 PROCEDURE — 92004 COMPRE OPH EXAM NEW PT 1/>: CPT

## 2025-04-29 ASSESSMENT — VISUAL ACUITY
CORRECTION_TYPE: GLASSES
METHOD: SNELLEN - LINEAR
OS_CC: 20/25
OD_CC: 20/20

## 2025-04-29 ASSESSMENT — CONF VISUAL FIELD
OD_NORMAL: 1
OS_NORMAL: 1
OS_INFERIOR_TEMPORAL_RESTRICTION: 0
OD_SUPERIOR_TEMPORAL_RESTRICTION: 0
OS_INFERIOR_NASAL_RESTRICTION: 0
OS_SUPERIOR_NASAL_RESTRICTION: 0
OD_INFERIOR_TEMPORAL_RESTRICTION: 0
OD_SUPERIOR_NASAL_RESTRICTION: 0
OS_SUPERIOR_TEMPORAL_RESTRICTION: 0
OD_INFERIOR_NASAL_RESTRICTION: 0

## 2025-04-29 ASSESSMENT — ENCOUNTER SYMPTOMS
CARDIOVASCULAR NEGATIVE: 0
ALLERGIC/IMMUNOLOGIC NEGATIVE: 0
NEUROLOGICAL NEGATIVE: 0
PSYCHIATRIC NEGATIVE: 0
GASTROINTESTINAL NEGATIVE: 0
MUSCULOSKELETAL NEGATIVE: 0
RESPIRATORY NEGATIVE: 0
ENDOCRINE NEGATIVE: 0
CONSTITUTIONAL NEGATIVE: 0
EYES NEGATIVE: 1
HEMATOLOGIC/LYMPHATIC NEGATIVE: 0

## 2025-04-29 ASSESSMENT — TONOMETRY
OS_IOP_MMHG: 20
OD_IOP_MMHG: 22
IOP_METHOD: GOLDMANN APPLANATION

## 2025-04-29 ASSESSMENT — EXTERNAL EXAM - LEFT EYE: OS_EXAM: NORMAL

## 2025-04-29 ASSESSMENT — SLIT LAMP EXAM - LIDS
COMMENTS: GOOD POSITION
COMMENTS: GOOD POSITION

## 2025-04-29 ASSESSMENT — CUP TO DISC RATIO
OD_RATIO: .3
OS_RATIO: .3

## 2025-04-29 ASSESSMENT — EXTERNAL EXAM - RIGHT EYE: OD_EXAM: NORMAL

## 2025-04-29 NOTE — PROGRESS NOTES
#prolactinoma  -normal eye exam      OCT mac 04/29/25  OD normal foveal contour, good retinal architecture, no IRF/subretinal fluid (SRF), EZ intact  OS normal foveal contour, good retinal architecture, no IRF/subretinal fluid (SRF), EZ intact    Pantoja visual field (HVF) 24-2   OD FL 8/10, MD -1.75, wnl   OS FL 4/10, MD -2.13, nonspecific inferior scattered deficits, wnl       F/u comprehensive ophthalmology 1 year for monitoring, sooner PRN   Retina PRN

## 2025-05-08 ENCOUNTER — HOSPITAL ENCOUNTER (OUTPATIENT)
Dept: RADIOLOGY | Facility: HOSPITAL | Age: 45
Discharge: HOME | End: 2025-05-08
Payer: COMMERCIAL

## 2025-05-08 VITALS
HEART RATE: 73 BPM | SYSTOLIC BLOOD PRESSURE: 114 MMHG | TEMPERATURE: 95.9 F | OXYGEN SATURATION: 100 % | RESPIRATION RATE: 16 BRPM | DIASTOLIC BLOOD PRESSURE: 64 MMHG

## 2025-05-08 DIAGNOSIS — K11.9 LESION OF PAROTID GLAND: ICD-10-CM

## 2025-05-08 LAB
ERYTHROCYTE [DISTWIDTH] IN BLOOD BY AUTOMATED COUNT: 14 % (ref 11.5–14.5)
HCT VFR BLD AUTO: 34 % (ref 36–46)
HGB BLD-MCNC: 10.9 G/DL (ref 12–16)
INR PPP: 0.9 (ref 0.9–1.2)
MCH RBC QN AUTO: 25.9 PG (ref 26–34)
MCHC RBC AUTO-ENTMCNC: 32.1 G/DL (ref 32–36)
MCV RBC AUTO: 81 FL (ref 80–100)
NRBC BLD-RTO: 0 /100 WBCS (ref 0–0)
PLATELET # BLD AUTO: 308 X10*3/UL (ref 150–450)
PREGNANCY TEST URINE, POC: NEGATIVE
PROTHROMBIN TIME: 10.3 SECONDS (ref 9.3–12.7)
RBC # BLD AUTO: 4.21 X10*6/UL (ref 4–5.2)
WBC # BLD AUTO: 8.3 X10*3/UL (ref 4.4–11.3)

## 2025-05-08 PROCEDURE — 2720000007 HC OR 272 NO HCPCS

## 2025-05-08 PROCEDURE — 85610 PROTHROMBIN TIME: CPT | Performed by: NURSE PRACTITIONER

## 2025-05-08 PROCEDURE — 85027 COMPLETE CBC AUTOMATED: CPT | Performed by: NURSE PRACTITIONER

## 2025-05-08 PROCEDURE — 36415 COLL VENOUS BLD VENIPUNCTURE: CPT | Performed by: NURSE PRACTITIONER

## 2025-05-08 PROCEDURE — 76942 ECHO GUIDE FOR BIOPSY: CPT

## 2025-05-08 PROCEDURE — 2500000004 HC RX 250 GENERAL PHARMACY W/ HCPCS (ALT 636 FOR OP/ED): Mod: JW | Performed by: RADIOLOGY

## 2025-05-08 RX ORDER — LIDOCAINE HYDROCHLORIDE 10 MG/ML
INJECTION, SOLUTION EPIDURAL; INFILTRATION; INTRACAUDAL; PERINEURAL
Status: COMPLETED | OUTPATIENT
Start: 2025-05-08 | End: 2025-05-08

## 2025-05-08 RX ADMIN — LIDOCAINE HYDROCHLORIDE 7 ML: 10 INJECTION, SOLUTION EPIDURAL; INFILTRATION; INTRACAUDAL; PERINEURAL at 09:01

## 2025-05-08 ASSESSMENT — PAIN SCALES - GENERAL
PAINLEVEL_OUTOF10: 0 - NO PAIN

## 2025-05-08 ASSESSMENT — COLUMBIA-SUICIDE SEVERITY RATING SCALE - C-SSRS
6. HAVE YOU EVER DONE ANYTHING, STARTED TO DO ANYTHING, OR PREPARED TO DO ANYTHING TO END YOUR LIFE?: NO
1. IN THE PAST MONTH, HAVE YOU WISHED YOU WERE DEAD OR WISHED YOU COULD GO TO SLEEP AND NOT WAKE UP?: NO
2. HAVE YOU ACTUALLY HAD ANY THOUGHTS OF KILLING YOURSELF?: NO

## 2025-05-08 ASSESSMENT — PAIN - FUNCTIONAL ASSESSMENT: PAIN_FUNCTIONAL_ASSESSMENT: 0-10

## 2025-05-08 NOTE — Clinical Note
Dr Bonilla obtained 2nd biopsy from parotid, pressure applied for 2 minutes. No bleeding and small 2x2 tegaderm dressing applied to site

## 2025-05-08 NOTE — DISCHARGE INSTRUCTIONS
Patient and Family Education  What is a Biopsy or Aspiration?  A biopsy or aspiration is used to help doctors diagnose disease. Small pieces of tissue or cells  are taken from the area using a special kind of needle. The tissue is sent to the lab to be looked  at under a microscope. The procedure can take up to 1 hour.  Before the Test:  ? If you take blood thinning medications, you Must ask your doctor when you should stop  taking them. You may need to stop taking the medicine up to seven (7) days prior to the  test.  ? Do Not Drink or Eat Anything after midnight the day before the test. You may take  medications with a small amount of clear liquid.  ? If you take daily oral diabetic medications, contact your Radiologist or your Radiology  Nurse to determine if you should take your medicine before the test or adjust the dosage.  ? Make plans for a ride home if you are an outpatient.  ? If you have an allergy to iodine or iodinated contrast, your doctor may prescribe special  pills to take before the test.  During the Test:  ? You will lie on a padded X-Ray table.  ? You may be given medicine that will make you drowsy.  ? You will also be given a local anesthetic to numb the biopsy site.  ? You will feel pressure during the biopsy.  After the Test:  ? You will remain on bed rest for 1 to 4 hours.  ? Your blood pressure, pulse and biopsy site will be checked often.  ? If a large sample of tissue needs to be taken, you may be admitted to the hospital for  observation.  Following these instructions for a safer recovery:     Page 2 of 2  Activity:  ? Limit your activity for 24 hours after the test.  ? Do not drive for 24 hours.  ? Do not do any heavy lifting, such as groceries, for 24 hours.  ? Avoid intense exercise and contact sports for 24 hours.  Diet:  ? You may resume your normal diet.  Medicines:  ? If you take medications to thin your blood, ask your doctor when you should start taking  them after the test.  ?  You may take your other medicines as ordered by your doctor.  Call your Doctor if you have:  ? Redness, swelling or pus-like drainage at the biopsy site.  ? Temperature of 100.4 F degrees or higher.  ? Increased pain or tenderness at the biopsy site.  ? Any questions.  Call your Doctor Right Away if you have any of the Signs:  ? Increase in pain in the biopsy site.  ? Dizziness or fainting.  ? Shortness of breath or trouble breathing.  ? Bleeding from the biopsy site.  If you are not able to contact your doctor, call 911 and/or go to the nearest hospital.     How to Reach your Doctor:  Call Dr. Pascual at [phone] with problems or questions.

## 2025-05-08 NOTE — Clinical Note
Biopsy sample obtained from right parotid gland, Dr Bonilla holding pressure at site due to bleeding

## 2025-05-15 LAB
LAB AP ASR DISCLAIMER: NORMAL
LABORATORY COMMENT REPORT: NORMAL
PATH REPORT.FINAL DX SPEC: NORMAL
PATH REPORT.GROSS SPEC: NORMAL
PATH REPORT.RELEVANT HX SPEC: NORMAL
PATH REPORT.TOTAL CANCER: NORMAL

## 2025-05-16 ENCOUNTER — TELEPHONE (OUTPATIENT)
Dept: OTOLARYNGOLOGY | Facility: CLINIC | Age: 45
End: 2025-05-16
Payer: COMMERCIAL

## 2025-05-16 DIAGNOSIS — K11.8 MASS OF RIGHT PAROTID GLAND: ICD-10-CM

## 2025-05-16 NOTE — TELEPHONE ENCOUNTER
Spoke with patient    Low-grade salivary neoplasm    MRI reviewed showing lesion in the anterior aspect of the right parotid gland    Right Parotidectomy has been recommended and reviewed again    Possible abdominal fat graft    CT neck with contrast prior to surgery      Rosie can you please arrange        Lengthy and detailed discussion was held with patient regarding parotidectomy including risk benefits and alternatives  including risks to the facial nerve was discussed. This includes partial or complete, temporary or permanent facial paralysis and the implications of each. They understand surgical incisions, numbness of the region, disfigurement, sialocele, salivary fistula, Catina's syndrome and perioperative risks given any associated medical comorbidities. Length of surgery, expected outcomes have all reviewed in detail option reviewed.  Verbal consent was obtained

## 2025-05-20 ENCOUNTER — APPOINTMENT (OUTPATIENT)
Dept: OPHTHALMOLOGY | Facility: CLINIC | Age: 45
End: 2025-05-20
Payer: COMMERCIAL

## 2025-05-30 ENCOUNTER — HOSPITAL ENCOUNTER (OUTPATIENT)
Dept: RADIOLOGY | Facility: HOSPITAL | Age: 45
Discharge: HOME | End: 2025-05-30
Payer: COMMERCIAL

## 2025-05-30 DIAGNOSIS — K11.8 MASS OF RIGHT PAROTID GLAND: ICD-10-CM

## 2025-05-30 PROCEDURE — 2550000001 HC RX 255 CONTRASTS: Performed by: OTOLARYNGOLOGY

## 2025-05-30 PROCEDURE — 70491 CT SOFT TISSUE NECK W/DYE: CPT

## 2025-05-30 RX ADMIN — IOHEXOL 75 ML: 350 INJECTION, SOLUTION INTRAVENOUS at 10:38

## 2025-06-18 ENCOUNTER — TELEPHONE (OUTPATIENT)
Dept: OTOLARYNGOLOGY | Facility: CLINIC | Age: 45
End: 2025-06-18
Payer: COMMERCIAL

## 2025-06-18 NOTE — TELEPHONE ENCOUNTER
Spoke with patient    Scan reviewed    Surgery scheduled for later in summer    Today to wait, if she wants to move it up send she can but I suspect we can keep it where it is

## 2025-07-14 ENCOUNTER — APPOINTMENT (OUTPATIENT)
Dept: RADIOLOGY | Facility: HOSPITAL | Age: 45
End: 2025-07-14
Payer: COMMERCIAL

## 2025-07-14 ENCOUNTER — HOSPITAL ENCOUNTER (EMERGENCY)
Facility: HOSPITAL | Age: 45
Discharge: HOME | End: 2025-07-14
Payer: COMMERCIAL

## 2025-07-14 VITALS
TEMPERATURE: 98.2 F | WEIGHT: 275 LBS | OXYGEN SATURATION: 99 % | HEIGHT: 64 IN | RESPIRATION RATE: 19 BRPM | HEART RATE: 63 BPM | BODY MASS INDEX: 46.95 KG/M2 | DIASTOLIC BLOOD PRESSURE: 83 MMHG | SYSTOLIC BLOOD PRESSURE: 147 MMHG

## 2025-07-14 DIAGNOSIS — K80.20 CALCULUS OF GALLBLADDER WITHOUT CHOLECYSTITIS WITHOUT OBSTRUCTION: Primary | ICD-10-CM

## 2025-07-14 LAB
ALBUMIN SERPL BCP-MCNC: 4.4 G/DL (ref 3.4–5)
ALP SERPL-CCNC: 52 U/L (ref 33–110)
ALT SERPL W P-5'-P-CCNC: 31 U/L (ref 7–45)
ANION GAP SERPL CALC-SCNC: 11 MMOL/L (ref 10–20)
APPEARANCE UR: CLEAR
AST SERPL W P-5'-P-CCNC: 16 U/L (ref 9–39)
B-HCG SERPL-ACNC: <2 MIU/ML
BASOPHILS # BLD AUTO: 0.05 X10*3/UL (ref 0–0.1)
BASOPHILS NFR BLD AUTO: 0.4 %
BILIRUB SERPL-MCNC: 0.3 MG/DL (ref 0–1.2)
BILIRUB UR STRIP.AUTO-MCNC: NEGATIVE MG/DL
BUN SERPL-MCNC: 14 MG/DL (ref 6–23)
CALCIUM SERPL-MCNC: 9.7 MG/DL (ref 8.6–10.3)
CHLORIDE SERPL-SCNC: 101 MMOL/L (ref 98–107)
CO2 SERPL-SCNC: 28 MMOL/L (ref 21–32)
COLOR UR: ABNORMAL
CREAT SERPL-MCNC: 0.79 MG/DL (ref 0.5–1.05)
EGFRCR SERPLBLD CKD-EPI 2021: >90 ML/MIN/1.73M*2
EOSINOPHIL # BLD AUTO: 0.07 X10*3/UL (ref 0–0.7)
EOSINOPHIL NFR BLD AUTO: 0.5 %
ERYTHROCYTE [DISTWIDTH] IN BLOOD BY AUTOMATED COUNT: 13.6 % (ref 11.5–14.5)
GLUCOSE SERPL-MCNC: 96 MG/DL (ref 74–99)
GLUCOSE UR STRIP.AUTO-MCNC: NORMAL MG/DL
HCT VFR BLD AUTO: 36.4 % (ref 36–46)
HGB BLD-MCNC: 11 G/DL (ref 12–16)
IMM GRANULOCYTES # BLD AUTO: 0.06 X10*3/UL (ref 0–0.7)
IMM GRANULOCYTES NFR BLD AUTO: 0.5 % (ref 0–0.9)
KETONES UR STRIP.AUTO-MCNC: NEGATIVE MG/DL
LACTATE SERPL-SCNC: 1.2 MMOL/L (ref 0.4–2)
LEUKOCYTE ESTERASE UR QL STRIP.AUTO: NEGATIVE
LIPASE SERPL-CCNC: 38 U/L (ref 9–82)
LYMPHOCYTES # BLD AUTO: 2.56 X10*3/UL (ref 1.2–4.8)
LYMPHOCYTES NFR BLD AUTO: 19.4 %
MAGNESIUM SERPL-MCNC: 2.22 MG/DL (ref 1.6–2.4)
MCH RBC QN AUTO: 25.9 PG (ref 26–34)
MCHC RBC AUTO-ENTMCNC: 30.2 G/DL (ref 32–36)
MCV RBC AUTO: 86 FL (ref 80–100)
MONOCYTES # BLD AUTO: 0.76 X10*3/UL (ref 0.1–1)
MONOCYTES NFR BLD AUTO: 5.7 %
NEUTROPHILS # BLD AUTO: 9.72 X10*3/UL (ref 1.2–7.7)
NEUTROPHILS NFR BLD AUTO: 73.5 %
NITRITE UR QL STRIP.AUTO: NEGATIVE
NRBC BLD-RTO: 0 /100 WBCS (ref 0–0)
PH UR STRIP.AUTO: 5 [PH]
PLATELET # BLD AUTO: 354 X10*3/UL (ref 150–450)
POTASSIUM SERPL-SCNC: 4 MMOL/L (ref 3.5–5.3)
PROT SERPL-MCNC: 7.6 G/DL (ref 6.4–8.2)
PROT UR STRIP.AUTO-MCNC: NEGATIVE MG/DL
RBC # BLD AUTO: 4.24 X10*6/UL (ref 4–5.2)
RBC # UR STRIP.AUTO: ABNORMAL MG/DL
RBC #/AREA URNS AUTO: NORMAL /HPF
SODIUM SERPL-SCNC: 136 MMOL/L (ref 136–145)
SP GR UR STRIP.AUTO: 1.02
SQUAMOUS #/AREA URNS AUTO: NORMAL /HPF
UROBILINOGEN UR STRIP.AUTO-MCNC: NORMAL MG/DL
WBC # BLD AUTO: 13.2 X10*3/UL (ref 4.4–11.3)
WBC #/AREA URNS AUTO: NORMAL /HPF

## 2025-07-14 PROCEDURE — 80053 COMPREHEN METABOLIC PANEL: CPT

## 2025-07-14 PROCEDURE — 83735 ASSAY OF MAGNESIUM: CPT

## 2025-07-14 PROCEDURE — 83605 ASSAY OF LACTIC ACID: CPT

## 2025-07-14 PROCEDURE — 99284 EMERGENCY DEPT VISIT MOD MDM: CPT | Mod: 25

## 2025-07-14 PROCEDURE — 85025 COMPLETE CBC W/AUTO DIFF WBC: CPT

## 2025-07-14 PROCEDURE — 81001 URINALYSIS AUTO W/SCOPE: CPT

## 2025-07-14 PROCEDURE — 83690 ASSAY OF LIPASE: CPT

## 2025-07-14 PROCEDURE — 84702 CHORIONIC GONADOTROPIN TEST: CPT

## 2025-07-14 PROCEDURE — 76705 ECHO EXAM OF ABDOMEN: CPT

## 2025-07-14 PROCEDURE — 76705 ECHO EXAM OF ABDOMEN: CPT | Performed by: STUDENT IN AN ORGANIZED HEALTH CARE EDUCATION/TRAINING PROGRAM

## 2025-07-14 ASSESSMENT — PAIN DESCRIPTION - DESCRIPTORS: DESCRIPTORS: SHARP;BURNING

## 2025-07-14 ASSESSMENT — PAIN SCALES - GENERAL: PAINLEVEL_OUTOF10: 8

## 2025-07-14 ASSESSMENT — PAIN - FUNCTIONAL ASSESSMENT: PAIN_FUNCTIONAL_ASSESSMENT: 0-10

## 2025-07-14 ASSESSMENT — PAIN DESCRIPTION - FREQUENCY: FREQUENCY: CONSTANT/CONTINUOUS

## 2025-07-14 ASSESSMENT — PAIN DESCRIPTION - ORIENTATION: ORIENTATION: RIGHT;UPPER

## 2025-07-14 ASSESSMENT — PAIN DESCRIPTION - LOCATION: LOCATION: ABDOMEN

## 2025-07-14 NOTE — ED TRIAGE NOTES
Pt presents to ED for RUQ pain radiating into her back starting around 1200 today. Pt states pain is 8.5/10. Pt denies difficulty urinating and stated still have normal BMs. Pt is a little nauseous but not vomiting. Pt is A&Ox4

## 2025-07-14 NOTE — ED TRIAGE NOTES
TRIAGE NOTE   I saw the patient as the Clinician in Triage and performed a brief history and physical exam, established acuity, and ordered appropriate tests to develop basic plan of care. Patient will be seen by an [place], resident and/or physician who will independently evaluate the patient. Please see subsequent provider notes for further details and disposition.     Brief HPI: In brief, Emerald Marie is a 45 y.o. female that presents for right upper quadrant pain.  Patient is reporting the pain began earlier today around noon.  Was not doing anything when it started.  Is having mild tenderness to the back as well.  Denies difficulty urinating or abnormal bowel movements.  Reporting that she is nauseous as well.  No abdominal surgeries in the past..     Focused Physical exam:   General: Awake, alert, in no acute distress, sitting upright in chair  CV: Regular rate, regular rhythm. Radial pulses 2+ bilaterally  Resp: Breathing non-labored, speaking in full sentences.  Clear to auscultation bilaterally  GI: Soft, non-distended, right upper quadrant tenderness.  No rebound or guarding.  No CVA tenderness  MSK/Extremities: No gross bony deformities. Moving all extremities       Plan/MDM:   Will obtain a right upper quadrant ultrasound.  Will also obtain lab work including a lipase and a lactate.  Please see subsequent provider note for further details and disposition

## 2025-07-15 ENCOUNTER — APPOINTMENT (OUTPATIENT)
Dept: ENDOCRINOLOGY | Facility: CLINIC | Age: 45
End: 2025-07-15
Payer: COMMERCIAL

## 2025-07-15 VITALS
SYSTOLIC BLOOD PRESSURE: 130 MMHG | BODY MASS INDEX: 47.55 KG/M2 | HEART RATE: 69 BPM | DIASTOLIC BLOOD PRESSURE: 84 MMHG | WEIGHT: 277 LBS

## 2025-07-15 DIAGNOSIS — D35.2 PROLACTINOMA (MULTI): ICD-10-CM

## 2025-07-15 PROCEDURE — 99204 OFFICE O/P NEW MOD 45 MIN: CPT | Performed by: INTERNAL MEDICINE

## 2025-07-15 ASSESSMENT — ENCOUNTER SYMPTOMS
UNEXPECTED WEIGHT CHANGE: 1
PALPITATIONS: 0
SHORTNESS OF BREATH: 0
FATIGUE: 1
NECK PAIN: 0
ROS SKIN COMMENTS: DRY
CONSTIPATION: 0
ABDOMINAL PAIN: 1
WEAKNESS: 0
NAUSEA: 0
VOMITING: 0
TREMORS: 0
HEADACHES: 0
APNEA: 1
DIZZINESS: 1
NERVOUS/ANXIOUS: 0
FEVER: 0
DIARRHEA: 0
TROUBLE SWALLOWING: 0

## 2025-07-15 NOTE — LETTER
July 24, 2025     Caty Brown, APRN-CNP  [address]  Department Of Neurological Surgery  [facility] [address]    Patient: Emerald Marie   YOB: 1980   Date of Visit: 7/15/2025       Dear Dr. Caty Brown, APRN-CNP:    Thank you for referring Emerald Marie to me for evaluation. Below are my notes for this consultation.  If you have questions, please do not hesitate to call me. I look forward to following your patient along with you.       Sincerely,     Shree Zheng MD      CC: Johanna Johnson, APRN-CNP  ______________________________________________________________________________________    History Of Present Illness  Emerald Marie is a 45 y.o. 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     History of galactorrhea at diagnosis and irregular but heavy periods.     At present, variable frequency of galactorrhea  Mammograms up to date    History of cabergoline, stopped over 6 years ago.     Known polycystic ovarian syndrome  No history of pregnancy  Hysterectomy 2020    Seen in ED yesterday for gallstone.     Right parotid mass, scheduled for surgery with Dr. Pascual 8/27/25.    Bariatric surgery plan, currently on hold.       Past Medical History  She has a past medical history of Abnormal Pap smear of cervix, Allergy status to unspecified drugs, medicaments and biological substances, Anemia, Anxiety (2022), CPAP (continuous positive airway pressure) dependence (2020), Depression (2022), Dizziness, Encounter for gynecological examination (general) (routine) without abnormal findings, Encounter for other general counseling and advice on contraception (03/27/2017), Encounter for other preprocedural examination (09/03/2020), Encounter for other specified surgical aftercare (02/22/2021), Encounter for screening for nutritional disorder, Encounter for screening for nutritional disorder (08/19/2020), Endometriosis (2020), Fibroid (2020), Hernia, internal, Hirsutism, History  of prediabetes, Hyperlipidemia, Infection following a procedure, superficial incisional surgical site, initial encounter (01/25/2021), Metabolic disease (2013), Morbid obesity (Multi), Obesity, unspecified (09/03/2020), DEVANTE (obstructive sleep apnea) (2020), Other prurigo (04/23/2021), Other specified disorders of nose and nasal sinuses (03/06/2020), PCOS (polycystic ovarian syndrome), Personal history of diseases of the blood and blood-forming organs and certain disorders involving the immune mechanism, Personal history of diseases of the skin and subcutaneous tissue (12/04/2018), Personal history of other benign neoplasm (10/13/2020), Personal history of other diseases of the female genital tract (11/13/2020), Personal history of other diseases of the female genital tract (02/05/2020), Personal history of other diseases of the respiratory system (02/21/2015), Personal history of other specified conditions (06/10/2020), Personal history of other specified conditions (09/04/2013), Persons encountering health services in other specified circumstances (07/29/2020), Polycystic ovarian disease (2015), Polycystic ovary syndrome (2014), Polyp of cervix uteri (09/08/2020), Prolactinoma (Multi), Radiculopathy, lumbar region (02/23/2016), Right lower quadrant pain (10/15/2019), Sleep apnea, Smoking, Snoring, Urinary tract infection (2023), Varicella, Vestibular migraine, and Vitamin D deficiency.    Surgical History  She has a past surgical history that includes Other surgical history (08/18/2021); Other surgical history (2015); Other surgical history (10/15/2019); Other surgical history (12/22/2016); Colposcopy; Microdiscectomy lumbar (2015); and Hysterectomy (12/29/2020).     Social History  She reports that she quit smoking about 2 years ago. Her smoking use included cigarettes. She started smoking about 17 years ago. She has a 15 pack-year smoking history. She has been exposed to tobacco smoke. She has never used  smokeless tobacco. She reports current alcohol use. She reports current drug use. Drug: Marijuana.    Family History  Family History[1]    Medications  Current Outpatient Medications   Medication Instructions   • cholecalciferol (VITAMIN D-3) 25 mcg, Daily   • ferrous sulfate (IRON ORAL) No dose, route, or frequency recorded.   • multivitamin tablet Multi Vitamin Daily TABS   Refills: 0       Active   • spironolactone (ALDACTONE) 150 mg, oral, Daily       Allergies  Azithromycin and Clarithromycin    Review of Systems   Constitutional:  Positive for fatigue and unexpected weight change. Negative for fever.   HENT:  Negative for trouble swallowing.    Eyes:  Negative for visual disturbance.   Respiratory:  Positive for apnea (sleep). Negative for shortness of breath.    Cardiovascular:  Negative for chest pain and palpitations.   Gastrointestinal:  Positive for abdominal pain. Negative for constipation, diarrhea, nausea and vomiting.   Endocrine: Negative for cold intolerance and heat intolerance.   Musculoskeletal:  Negative for neck pain.   Skin:  Negative for rash.        dry   Neurological:  Positive for dizziness. Negative for tremors, weakness and headaches.   Psychiatric/Behavioral:  The patient is not nervous/anxious.          Last Recorded Vitals  Blood pressure 130/84, pulse 69, weight 126 kg (277 lb), last menstrual period 10/22/2020.    Physical Exam  Constitutional:       General: She is not in acute distress.  HENT:      Head: Normocephalic.      Mouth/Throat:      Mouth: Mucous membranes are moist.   Eyes:      Extraocular Movements: Extraocular movements intact.   Neck:      Thyroid: No thyroid mass or thyromegaly.   Cardiovascular:      Pulses:           Radial pulses are 2+ on the right side and 2+ on the left side.   Musculoskeletal:      Right lower leg: No edema.      Left lower leg: No edema.   Lymphadenopathy:      Cervical: No cervical adenopathy.   Neurological:      Mental Status: She is  alert.      Motor: No tremor.   Psychiatric:         Mood and Affect: Affect normal.          Relevant Results   Latest Reference Range & Units 02/13/25 10:32   CREATININE 0.50 - 0.99 mg/dL 0.78   EGFR > OR = 60 mL/min/1.73m2 97   CORTISOL, TOTAL, LC/MS mcg/dL 6.4   FSH mIU/mL 9.5   LH mIU/mL 2.5   PROLACTIN ng/mL 14.2   TSH mIU/L 1.43   GROWTH HORMONE (GH) <=7.1 ng/mL 0.1   DHEA SULFATE 15 - 205 mcg/dL 278 (H)   ACTH, PLASMA 6 - 50 pg/mL 23   IGF 1, LC/MS 52 - 328 ng/mL 150   Z SCORE (MALE) -2.0 - 2.0 SD 0.1       MR SELLA W AND WO IV CONTRAST;  3/11/2025 3:56 pm  INDICATION:  Signs/Symptoms:prolactinoma.      ,D35.2 Benign neoplasm of pituitary gland (Multi)      COMPARISON:  NR MRI SELLA WO/W 2/7/2020      ACCESSION NUMBER(S):  [id number]      ORDERING CLINICIAN:  AMARI BORJA      TECHNIQUE:  High resolution coronal and sagittal T1 and T2 coronal weighted MR  images of the sella were acquired. Following administration of 25 ML  Dotarem gadolinium based contrast, high resolution post contrast  axial and sagittal T1 weighted MR images of the sella were obtained.      FINDINGS:  Similar to minimally decreased T2 hyperintense, hypoenhancing lesion  along the left posterolateral aspect of the anterior pituitary  involving the region of the pars intermedia which measures 0.5 x 0.5  cm (TV X CC) on saved image reconstruction), previously measuring 0.6  x 0.6 cm on a similar view. There is persistent minimal extension  across the midline, similar to prior examination. The pituitary  infundibulum is midline. The optic chiasm is within normal limits.  Bilateral cavernous sinuses demonstrate symmetric enhancement.      There is a lobular T1 hypointense, T2 intermediate signal, enhancing  lesion in the superficial right parotid gland adjacent to the ramus  of the right mandible which measures 1.1 x 1.0 x 1.4 cm (AP x TV x  CC) (Series 5, Image 151) (Series 3, Image 9) , retrospectively it  measures 0.9 x 0.8 x 0.9 cm  on prior examination from 2020.      Visualized internal carotid arteries are within normal limits. There  is a dominant right vertebral artery.      The ventricles, sulci, and basal cisterns are within normal limits.      No intracranial focal parenchymal signal abnormality or abnormal  parenchymal enhancement. No mass effect or midline shift.      Small amounts of layering fluid within the right sphenoid sinus. The  remaining paranasal sinuses and mastoid air cells are clear.      2 nonspecific scalp nodules are noted overlying the left parietal  convexity, more prominent focus measures 12 mm in maximum transverse  dimension and has slightly increased in size as compared to prior  exam. There is a smaller cutaneous lesion overlying the left lateral  frontotemporal region which is unchanged as compared to prior study.      IMPRESSION:  1. Similar to minimally decreased size of a cystic lesion along the  left posterolateral aspect of the anterior pituitary involving the  region of the pars intermedia. No new mass lesion was identified.  2. Enhancing lobular lesion in the superficial right parotid gland  adjacent to the ramus of the right mandible which demonstrates mildly  increased size when compared to examination from 2020. Findings may  be seen in the setting of a pleomorphic adenoma or other parotid  neoplasm.  3. 2 left parietal small scalp nodules, slightly increased in size as  compared to prior study. These may reflect sebaceous cysts or other  scalp lesions.      I personally reviewed the images/study and I agree with the findings  as stated by Arjun Childs MD. This study was interpreted at  [facility], [place], [place].      MACRO:  None      Signed by: Pauly Ruth 3/12/2025 11:33 AM      IMPRESSION  PROLACTINOMA  Stable 0.6 cm pituitary lesion on MRI  History of galactorrhea  Currently off cabergoline      RECOMMENDATIONS  Draw prolactin with labs for GAIL Johnson  next week    Follow up March 2026  Repeat prolactin before March appointment.   Imaging as ordered by MIKAYLA Brown.          [1]  Family History  Problem Relation Name Age of Onset   • Heart failure Mother Valeria    • Hypertension Mother Valeria    • Diabetes Mother Valeria    • Stroke Mother Valeria    • Seizures Mother Valeria    • Diabetes Father Ramon    • Diabetes Sister Esperanza    • Breast cancer Maternal Grandmother Sujatha 50   • Stomach cancer Maternal Grandmother Sujatha    • Uterine cancer Maternal Grandmother Sujatha    • Cancer Maternal Grandmother Sujatha    • Alzheimer's disease Maternal Grandfather Lan    • Diabetes Maternal Grandfather Lan    • Stroke Maternal Grandfather Lan    • Restless legs syndrome Maternal Grandfather Lan    • Heart failure Paternal Grandmother Ramon    • Vision loss Paternal Grandmother Ramon    • Cancer Paternal Grandfather Ramon    • Schizophrenia Other Cousin         [1]  Family History  Problem Relation Name Age of Onset   • Heart failure Mother Valeria    • Hypertension Mother Valeria    • Diabetes Mother Valeria    • Stroke Mother Valerai    • Seizures Mother Valeria    • Diabetes Father Ramon    • Diabetes Sister Esperanza    • Breast cancer Maternal Grandmother Sujatha 50   • Stomach cancer Maternal Grandmother Sujatha    • Uterine cancer Maternal Grandmother Sujatha    • Cancer Maternal Grandmother Sujatha    • Alzheimer's disease Maternal Grandfather Lan    • Diabetes Maternal Grandfather Lan    • Stroke Maternal Grandfather Lan    • Restless legs syndrome Maternal Grandfather Lan    • Heart failure Paternal Grandmother Ramon    • Vision loss Paternal Grandmother Ramon    • Cancer Paternal Grandfather Ramon    • Schizophrenia Other Cousin

## 2025-07-15 NOTE — ED PROVIDER NOTES
History of Present Illness     History provided by: Patient  Limitations to History: None  External Records Reviewed with Brief Summary: Outpatient progress note from 3/7/2025 which showed outpatient visit with primary care physician for bariatric surgery    HPI:  Emerald Marie is a 45 y.o. female with a past medical history of obesity, iron deficiency anemia, DEVANTE on CPAP, hyperlipidemia who is presenting with abdominal pain.  Patient is reporting right upper quadrant abdominal pain began earlier today around noon.  Not associated with eating.  Denies back pain.  Normal bowel movements.  No dysuria.  Reporting nausea    Physical Exam   Triage vitals:  T 37.1 °C (98.7 °F)  HR 68  BP (!) 154/94  RR 19  O2 95 % None (Room air)    General: Awake, alert, in no acute distress  Eyes: Gaze conjugate.  No scleral icterus or injection  HENT: Normo-cephalic, atraumatic. No stridor  CV: Regular rate, regular rhythm. Radial pulses 2+ bilaterally  Resp: Breathing non-labored, speaking in full sentences.  Clear to auscultation bilaterally  GI: Soft, non-distended, right upper quadrant tender. No rebound or guarding.  MSK/Extremities: No gross bony deformities. Moving all extremities  Skin: Warm. Appropriate color  Neuro: Alert. Oriented. Face symmetric. Speech is fluent.  Gross strength and sensation intact in b/l UE and LEs  Psych: Appropriate mood and affect      Medical Decision Making & ED Course   Medical Decision Makin y.o. female with a past medical history of obesity, iron deficiency anemia, DEVANTE, hyperlipidemia who is presenting today with right upper quadrant pain.  Vital signs within normal limits.  On exam, patient has right upper quadrant tenderness without rebound or guarding.  Patient's exam is concerning for acute cholecystitis versus cholelithiasis versus pancreatitis.  Will obtain lab work including a CBC, CMP, lipase and lactate.  Patient's lab work was reviewed which showed a mild leukocytosis  normal LFTs normal bilirubin.  Negative pregnancy test negative lipase.  Right upper quadrant ultrasound showed cholelithiasis without acute cholecystitis.  On reevaluation, patient was reporting improvement in pain.  Patient was offered Pain medication and antiemetics and declined at this time. Will discharge with acs follow up.   ----      Differential diagnoses considered include but are not limited to: Please see MDM.     Social Determinants of Health which Significantly Impact Care: None identified     EKG Independent Interpretation: If an EKG interpretation is available. Please see ED Course and MDM for full interpretation.    Independent Result Review and Interpretation: Results were independently reviewed and interpreted by myself. Please see ED course and MDM for full interpretation.    Chronic conditions affecting the patient's care: As documented in the MDM    The patient was discussed with the following consultants/services: None    Care Considerations: As per [facility]    ED Course:  ED Course as of 07/14/25 2124   Mon Jul 14, 2025 2019 Lab work was independently reviewed it was reassuring except for mild leukocytosis.  Ultrasound of the right upper quadrant showed gallstones without cholecystitis.  The patient was given a referral to [facility].  Will be discharged home with Motrin and Tylenol. [DARNELL]      ED Course User Index  [DARNELL] Lisa Kwok MD         Diagnoses as of 07/14/25 2124   Calculus of gallbladder without cholecystitis without obstruction     Disposition   As a result of the work-up, the patient was discharged home.  she was informed of her diagnosis and instructed to come back with any concerns or worsening of condition.  she and was agreeable to the plan as discussed above.  she was given the opportunity to ask questions.  All of the patient's questions were answered.    Procedures   Procedures    Patient seen and discussed with ED attending physician.    Lisa Kwok MD  Emergency Medicine      Lisa Kwok MD  07/14/25 0142

## 2025-07-15 NOTE — DISCHARGE INSTRUCTIONS
You have been evaluated in the Emergency Department today for gallstones.     Please follow up with your primary care physician within two days. If you do not have a primary care doctor you may call [phone] to make an appointment.    Return to the Emergency Department if you experience worsening abdominal pain, inability to tolerate po intake, skin discoloration. Return to the Emergency Department if you develop any new or worsening symptoms.   Thank you for choosing us for your care.

## 2025-07-15 NOTE — PATIENT INSTRUCTIONS
RECOMMENDATIONS  Draw prolactin with labs for GAIL Johnson next week    Follow up March 2026  Repeat prolactin before March appointment.   Imaging as ordered by MIKAYLA Brown.

## 2025-07-15 NOTE — PROGRESS NOTES
History Of Present Illness  Emerald Marie is a 45 y.o. 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     History of galactorrhea at diagnosis and irregular but heavy periods.     At present, variable frequency of galactorrhea  Mammograms up to date    History of cabergoline, stopped over 6 years ago.     Known polycystic ovarian syndrome  No history of pregnancy  Hysterectomy 2020    Seen in ED yesterday for gallstone.     Right parotid mass, scheduled for surgery with Dr. Pascual 8/27/25.    Bariatric surgery plan, currently on hold.       Past Medical History  She has a past medical history of Abnormal Pap smear of cervix, Allergy status to unspecified drugs, medicaments and biological substances, Anemia, Anxiety (2022), CPAP (continuous positive airway pressure) dependence (2020), Depression (2022), Dizziness, Encounter for gynecological examination (general) (routine) without abnormal findings, Encounter for other general counseling and advice on contraception (03/27/2017), Encounter for other preprocedural examination (09/03/2020), Encounter for other specified surgical aftercare (02/22/2021), Encounter for screening for nutritional disorder, Encounter for screening for nutritional disorder (08/19/2020), Endometriosis (2020), Fibroid (2020), Hernia, internal, Hirsutism, History of prediabetes, Hyperlipidemia, Infection following a procedure, superficial incisional surgical site, initial encounter (01/25/2021), Metabolic disease (2013), Morbid obesity (Multi), Obesity, unspecified (09/03/2020), DEVANTE (obstructive sleep apnea) (2020), Other prurigo (04/23/2021), Other specified disorders of nose and nasal sinuses (03/06/2020), PCOS (polycystic ovarian syndrome), Personal history of diseases of the blood and blood-forming organs and certain disorders involving the immune mechanism, Personal history of diseases of the skin and subcutaneous tissue (12/04/2018), Personal history of other benign neoplasm  (10/13/2020), Personal history of other diseases of the female genital tract (11/13/2020), Personal history of other diseases of the female genital tract (02/05/2020), Personal history of other diseases of the respiratory system (02/21/2015), Personal history of other specified conditions (06/10/2020), Personal history of other specified conditions (09/04/2013), Persons encountering health services in other specified circumstances (07/29/2020), Polycystic ovarian disease (2015), Polycystic ovary syndrome (2014), Polyp of cervix uteri (09/08/2020), Prolactinoma (Multi), Radiculopathy, lumbar region (02/23/2016), Right lower quadrant pain (10/15/2019), Sleep apnea, Smoking, Snoring, Urinary tract infection (2023), Varicella, Vestibular migraine, and Vitamin D deficiency.    Surgical History  She has a past surgical history that includes Other surgical history (08/18/2021); Other surgical history (2015); Other surgical history (10/15/2019); Other surgical history (12/22/2016); Colposcopy; Microdiscectomy lumbar (2015); and Hysterectomy (12/29/2020).     Social History  She reports that she quit smoking about 2 years ago. Her smoking use included cigarettes. She started smoking about 17 years ago. She has a 15 pack-year smoking history. She has been exposed to tobacco smoke. She has never used smokeless tobacco. She reports current alcohol use. She reports current drug use. Drug: Marijuana.    Family History  Family History[1]    Medications  Current Outpatient Medications   Medication Instructions    cholecalciferol (VITAMIN D-3) 25 mcg, Daily    ferrous sulfate (IRON ORAL) No dose, route, or frequency recorded.    multivitamin tablet Multi Vitamin Daily TABS   Refills: 0       Active    spironolactone (ALDACTONE) 150 mg, oral, Daily       Allergies  Azithromycin and Clarithromycin    Review of Systems   Constitutional:  Positive for fatigue and unexpected weight change. Negative for fever.   HENT:  Negative for  trouble swallowing.    Eyes:  Negative for visual disturbance.   Respiratory:  Positive for apnea (sleep). Negative for shortness of breath.    Cardiovascular:  Negative for chest pain and palpitations.   Gastrointestinal:  Positive for abdominal pain. Negative for constipation, diarrhea, nausea and vomiting.   Endocrine: Negative for cold intolerance and heat intolerance.   Musculoskeletal:  Negative for neck pain.   Skin:  Negative for rash.        dry   Neurological:  Positive for dizziness. Negative for tremors, weakness and headaches.   Psychiatric/Behavioral:  The patient is not nervous/anxious.          Last Recorded Vitals  Blood pressure 130/84, pulse 69, weight 126 kg (277 lb), last menstrual period 10/22/2020.    Physical Exam  Constitutional:       General: She is not in acute distress.  HENT:      Head: Normocephalic.      Mouth/Throat:      Mouth: Mucous membranes are moist.   Eyes:      Extraocular Movements: Extraocular movements intact.   Neck:      Thyroid: No thyroid mass or thyromegaly.   Cardiovascular:      Pulses:           Radial pulses are 2+ on the right side and 2+ on the left side.   Musculoskeletal:      Right lower leg: No edema.      Left lower leg: No edema.   Lymphadenopathy:      Cervical: No cervical adenopathy.   Neurological:      Mental Status: She is alert.      Motor: No tremor.   Psychiatric:         Mood and Affect: Affect normal.          Relevant Results   Latest Reference Range & Units 02/13/25 10:32   CREATININE 0.50 - 0.99 mg/dL 0.78   EGFR > OR = 60 mL/min/1.73m2 97   CORTISOL, TOTAL, LC/MS mcg/dL 6.4   FSH mIU/mL 9.5   LH mIU/mL 2.5   PROLACTIN ng/mL 14.2   TSH mIU/L 1.43   GROWTH HORMONE (GH) <=7.1 ng/mL 0.1   DHEA SULFATE 15 - 205 mcg/dL 278 (H)   ACTH, PLASMA 6 - 50 pg/mL 23   IGF 1, LC/MS 52 - 328 ng/mL 150   Z SCORE (MALE) -2.0 - 2.0 SD 0.1       MR SELLA W AND WO IV CONTRAST;  3/11/2025 3:56 pm  INDICATION:  Signs/Symptoms:prolactinoma.      ,D35.2 Benign  neoplasm of pituitary gland (Multi)      COMPARISON:  NR MRI SELLA WO/W 2/7/2020      ACCESSION NUMBER(S):  [id number]      ORDERING CLINICIAN:  AMARI BORJA      TECHNIQUE:  High resolution coronal and sagittal T1 and T2 coronal weighted MR  images of the sella were acquired. Following administration of 25 ML  Dotarem gadolinium based contrast, high resolution post contrast  axial and sagittal T1 weighted MR images of the sella were obtained.      FINDINGS:  Similar to minimally decreased T2 hyperintense, hypoenhancing lesion  along the left posterolateral aspect of the anterior pituitary  involving the region of the pars intermedia which measures 0.5 x 0.5  cm (TV X CC) on saved image reconstruction), previously measuring 0.6  x 0.6 cm on a similar view. There is persistent minimal extension  across the midline, similar to prior examination. The pituitary  infundibulum is midline. The optic chiasm is within normal limits.  Bilateral cavernous sinuses demonstrate symmetric enhancement.      There is a lobular T1 hypointense, T2 intermediate signal, enhancing  lesion in the superficial right parotid gland adjacent to the ramus  of the right mandible which measures 1.1 x 1.0 x 1.4 cm (AP x TV x  CC) (Series 5, Image 151) (Series 3, Image 9) , retrospectively it  measures 0.9 x 0.8 x 0.9 cm on prior examination from 2020.      Visualized internal carotid arteries are within normal limits. There  is a dominant right vertebral artery.      The ventricles, sulci, and basal cisterns are within normal limits.      No intracranial focal parenchymal signal abnormality or abnormal  parenchymal enhancement. No mass effect or midline shift.      Small amounts of layering fluid within the right sphenoid sinus. The  remaining paranasal sinuses and mastoid air cells are clear.      2 nonspecific scalp nodules are noted overlying the left parietal  convexity, more prominent focus measures 12 mm in maximum  transverse  dimension and has slightly increased in size as compared to prior  exam. There is a smaller cutaneous lesion overlying the left lateral  frontotemporal region which is unchanged as compared to prior study.      IMPRESSION:  1. Similar to minimally decreased size of a cystic lesion along the  left posterolateral aspect of the anterior pituitary involving the  region of the pars intermedia. No new mass lesion was identified.  2. Enhancing lobular lesion in the superficial right parotid gland  adjacent to the ramus of the right mandible which demonstrates mildly  increased size when compared to examination from 2020. Findings may  be seen in the setting of a pleomorphic adenoma or other parotid  neoplasm.  3. 2 left parietal small scalp nodules, slightly increased in size as  compared to prior study. These may reflect sebaceous cysts or other  scalp lesions.      I personally reviewed the images/study and I agree with the findings  as stated by Arjun Childs MD. This study was interpreted at  [facility], [place], [place].      MACRO:  None      Signed by: Pauly Ruth 3/12/2025 11:33 AM      IMPRESSION  PROLACTINOMA  Stable 0.6 cm pituitary lesion on MRI  History of galactorrhea  Currently off cabergoline      RECOMMENDATIONS  Draw prolactin with labs for GAIL Johnson next week    Follow up March 2026  Repeat prolactin before March appointment.   Imaging as ordered by MIKAYLA Brown.          [1]   Family History  Problem Relation Name Age of Onset    Heart failure Mother Valeria     Hypertension Mother Valeria     Diabetes Mother Valeria     Stroke Mother Valeria     Seizures Mother Valeria     Diabetes Father Ramon     Diabetes Sister Esperanza     Breast cancer Maternal Grandmother Sujatha 50    Stomach cancer Maternal Grandmother Sujatha     Uterine cancer Maternal Grandmother Sujatha     Cancer Maternal Grandmother Sujatha     Alzheimer's disease Maternal Grandfather Lan      Diabetes Maternal Grandfather Lan     Stroke Maternal Grandfather Lan     Restless legs syndrome Maternal Grandfather Lan     Heart failure Paternal Grandmother Ramon     Vision loss Paternal Grandmother Ramon     Cancer Paternal Grandfather Ramon     Schizophrenia Other Cousin

## 2025-07-20 ASSESSMENT — PROMIS GLOBAL HEALTH SCALE
RATE_PHYSICAL_HEALTH: FAIR
EMOTIONAL_PROBLEMS: RARELY
RATE_GENERAL_HEALTH: FAIR
RATE_SOCIAL_SATISFACTION: GOOD
RATE_QUALITY_OF_LIFE: GOOD
RATE_AVERAGE_FATIGUE: MODERATE
RATE_AVERAGE_PAIN: 5
CARRYOUT_PHYSICAL_ACTIVITIES: COMPLETELY
RATE_MENTAL_HEALTH: GOOD
CARRYOUT_SOCIAL_ACTIVITIES: GOOD

## 2025-07-24 NOTE — PROGRESS NOTES
"Subjective   Reason for Visit: Emerald Marie is an 45 y.o. female here for a CPE     Chief Complaint   Patient presents with    Annual Exam     Emerald Marie is a 45 y.o. female is here today for a CPE. Patient reports No questions or concerns at this time.           HPI  Emerald is a 44 yo est female presenting today for Annual CPE  LOV: OCT 2023     Dx: OBESITY, PROLACTINOMA (), LORI, DEVANTE on CPAP, HLD     Current Providers  Specialists: I have reviewed specialist-related care of the patient in the medical record.   ENDO: LISBETH  OPTHAL: HAMLET  ENT: MIKKI     #HYPERPROLACTINEMIA/PROLACTINOMA  Pt is followed by ENT, Dr. Pascual  MRI 2025 --> \"minimally decreased size of a cystic lesion along the left posterolateral aspect of the anterior pituitary involving the region of the pars intermedia. No new mass lesion\"  Found parotid mass in May 2025 ---> \"Parotid and submandibular glands: Redemonstration of a circumscribed homogenously enhancing soft tissue mass within the anteromedial and  superior right parotid gland measuring 1.4 x 1.2 x 1.2 cm\"---> Surgery is next month     #DEVANTE  Dx'd 2019  Compliant with CPAP nightly     She went to ER last week for RUQ pain---> found to have several gall stones---> has appt with Dr. Lentz next week       #CPE   Occupation: [employer]    Do you take any herbs or supplements that were not prescribed by a doctor? Vitamin D, iron, MVI    LMP: HYSTER   PAP: HYSTER     Mammogram: UTD     Fasting blood work: DUE   Last eye exam: 2025  Last dental Exam:    Exercise: not regularly   Mood: no concerns   Sleep: no concerns   Vaccines: Tdap today       Health Maintenance Due   Topic Date Due    HIV Screening  Never done    MMR Vaccines (1 of 1 - Standard series) Never done    Varicella Vaccines (1 of 2 - 13+ 2-dose series) Never done    Hepatitis C Screening  Never done    Hepatitis B Vaccines (1 of 3 - 19+ 3-dose series) Never done    " Hepatitis A Vaccines (1 of 2 - Risk 2-dose series) Never done    HPV Vaccines (1 - 3-dose standard series) Never done    COVID-19 Vaccine (4 - 2024-25 season) 09/01/2024    Diabetes: Hemoglobin A1C  08/20/2025       RX Allergies[1]         No visits with results within 60 Day(s) from this visit.   Latest known visit with results is:   Hospital Outpatient Visit on 05/08/2025   Component Date Value Ref Range Status    WBC 05/08/2025 8.3  4.4 - 11.3 x10*3/uL Final    nRBC 05/08/2025 0.0  0.0 - 0.0 /100 WBCs Final    RBC 05/08/2025 4.21  4.00 - 5.20 x10*6/uL Final    Hemoglobin 05/08/2025 10.9 (L)  12.0 - 16.0 g/dL Final    Hematocrit 05/08/2025 34.0 (L)  36.0 - 46.0 % Final    MCV 05/08/2025 81  80 - 100 fL Final    MCH 05/08/2025 25.9 (L)  26.0 - 34.0 pg Final    MCHC 05/08/2025 32.1  32.0 - 36.0 g/dL Final    RDW 05/08/2025 14.0  11.5 - 14.5 % Final    Platelets 05/08/2025 308  150 - 450 x10*3/uL Final    Protime 05/08/2025 10.3  9.3 - 12.7 seconds Final    INR 05/08/2025 0.9  0.9 - 1.2 Final    Preg Test, Ur 05/08/2025 Negative  Negative In process    Case Report 05/08/2025    Final                    Value:Surgical Pathology                                Case: [id number]                                  Authorizing Provider:  Azael Pascual MD           Collected:           05/08/2025 0909              Ordering Location:     [facility]       Received:            05/08/2025 0924                                     Center                                                                       Pathologist:           Yury Martínez MD                                                             Specimen:    PAROTID BIOPSY RIGHT, Ultrasound-guided right parotid biopsy                               FINAL DIAGNOSIS 05/08/2025    Final                    Value:Right parotid, core biopsy:  - Low grade salivary gland neoplasm.  See note.    Note: Sections show a biphasic salivary gland neoplasm comprised of larger  "oncocytic epithelioid cells and smaller cells most consistent with myoepithelial cells.  No mesenchymal component is identified in this biopsy specimen.  By immunostaining, PLAG1 is diffusely positive in the majority of both cell types, while CK7 is positive predominantly in the larger oncocytic type cells and p40 is predominantly positive in the smaller cells.  SOX10 is variably positive in both cell types.   is positive and scattered larger cells.  DOG1 is negative.  The proliferation index by Ki-67 immunostaining is approximately 10%.  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    :  Dr. Jose Nagel    jilanaw        05/08/2025    Final                    Value:By the signature on this report, the individual or group listed as making the Final Interpretation/Diagnosis certifies that they have reviewed this case.       Clinical History 05/08/2025    Final                    Value:Lesion of parotid gland (K11.9)      Gross Description 05/08/2025    Final                    Value:Received in formalin labeled with the patient's name and hospital number and \"right parotid\", are two tan soft tissue cores measuring 0.7 cm and 1.9 cm in length and both 0.1 cm in diameter.  The specimen is submitted in toto in 2 cassettes.  [facility]    Gross dissection performed at:  [place] [address]  Phone: [phone]  Fax: [phone]      Disclaimer 05/08/2025    Final                    Value:One or more of the reagents used to perform assays on this specimen MAY have contained components considered to be analyte specific reagents (ASR's).  ASR's have not been cleared or approved by the U.S. Food and Drug Administration.  These assays were developed and their performance " "characteristics determined by the Department of Pathology at [facility]. The FDA does not require this test to go through premarket FDA review. This test is used for clinical purposes. It should not be regarded as investigational or for research. This laboratory is certified under the Clinical Laboratory Improvement Amendments (CLIA) as qualified to perform high complexity clinical laboratory testing.  The assays were performed with appropriate positive and negative controls which stained appropriately.           Patient Care Team:  BETH Shields-CNP as PCP - General (Family Medicine)  Lizet Khanna MD as PCP - Employee [employer] PCP     Review of Systems  ROS was completed and all systems are negative with the exception of what was noted in the the HPI.       Objective     Last Recorded Vitals:  /77   Pulse 67   Ht 1.626 m (5' 4\")   Wt 125 kg (276 lb 3.2 oz)   LMP 10/22/2020   SpO2 96%   BMI 47.41 kg/m²       Surgical History[2]    Current Outpatient Medications   Medication Instructions    cholecalciferol (VITAMIN D-3) 25 mcg, Daily    ferrous sulfate (IRON ORAL) No dose, route, or frequency recorded.    multivitamin tablet Multi Vitamin Daily TABS   Refills: 0       Active       Physical Exam  Vitals reviewed.   Constitutional:       Appearance: She is obese.     Cardiovascular:      Pulses: Normal pulses.      Heart sounds: Normal heart sounds.   Pulmonary:      Effort: Pulmonary effort is normal.      Breath sounds: Normal breath sounds.     Neurological:      Mental Status: She is alert and oriented to person, place, and time.         Assessment & Plan  Annual physical exam  - Counseled on healthy diet and regular exercise  - Fall avoidance information provided  - Personalized prevention plan provided   - Mammogram UTD  - PAP UTD  - Vaccines UTD  - FBW  ordered       Hyperprolactinemia (Multi)  Condition stable based on symptoms and exam.    Continue current " medications and follow-up at least yearly.         Pituitary mass (Multi)  Condition stable based on symptoms and exam.    Continue current medications and follow-up at least yearly.         Obstructive sleep apnea of adult  Dx'd 2019  Compliant w/ CPAP nightly        BMI 45.0-49.9, adult (Multi)  In a face to face session, I informed patient of his/her BMI > 30.  We discussed appropriate nutrition choices and exercise plan to help achieve weight reduction.   Aim for 60 gm of Protein daily  Drink 60 oz of Water daily  Exercise 60 min EVERYDAY     Orders:    Hemoglobin A1C; Future    Lipid screening    Orders:    Lipid Panel; Future    Screening for diabetes mellitus    Orders:    Hemoglobin A1C; Future    Need for vaccination    Orders:    Tdap vaccine, age 7 years and older  (BOOSTRIX)                  [1]   Allergies  Allergen Reactions    Azithromycin Rash    Clarithromycin Rash     HIVES   [2]   Past Surgical History:  Procedure Laterality Date    COLPOSCOPY      HYSTERECTOMY  12/29/2020    MICRODISCECTOMY LUMBAR  2015    OTHER SURGICAL HISTORY  08/18/2021    Partial hysterectomy    OTHER SURGICAL HISTORY  2015    Lumbar laminectomy    OTHER SURGICAL HISTORY  10/15/2019    [place] tooth extraction    OTHER SURGICAL HISTORY  12/22/2016    Dental Surgery

## 2025-07-25 ENCOUNTER — APPOINTMENT (OUTPATIENT)
Dept: PRIMARY CARE | Facility: CLINIC | Age: 45
End: 2025-07-25
Payer: COMMERCIAL

## 2025-07-25 VITALS
HEART RATE: 67 BPM | WEIGHT: 276.2 LBS | DIASTOLIC BLOOD PRESSURE: 77 MMHG | SYSTOLIC BLOOD PRESSURE: 120 MMHG | OXYGEN SATURATION: 96 % | BODY MASS INDEX: 47.15 KG/M2 | HEIGHT: 64 IN

## 2025-07-25 DIAGNOSIS — Z13.1 SCREENING FOR DIABETES MELLITUS: ICD-10-CM

## 2025-07-25 DIAGNOSIS — G47.33 OBSTRUCTIVE SLEEP APNEA OF ADULT: ICD-10-CM

## 2025-07-25 DIAGNOSIS — E23.6 PITUITARY MASS (MULTI): ICD-10-CM

## 2025-07-25 DIAGNOSIS — Z13.220 LIPID SCREENING: ICD-10-CM

## 2025-07-25 DIAGNOSIS — Z00.00 ANNUAL PHYSICAL EXAM: Primary | ICD-10-CM

## 2025-07-25 DIAGNOSIS — Z23 NEED FOR VACCINATION: ICD-10-CM

## 2025-07-25 DIAGNOSIS — E22.1 HYPERPROLACTINEMIA (MULTI): ICD-10-CM

## 2025-07-25 PROBLEM — D68.9 COAGULATION DISORDER (MULTI): Status: ACTIVE | Noted: 2025-07-25

## 2025-07-25 PROBLEM — D68.9 COAGULATION DISORDER (MULTI): Status: RESOLVED | Noted: 2025-07-25 | Resolved: 2025-07-25

## 2025-07-25 PROCEDURE — 99396 PREV VISIT EST AGE 40-64: CPT | Performed by: NURSE PRACTITIONER

## 2025-07-25 PROCEDURE — 90715 TDAP VACCINE 7 YRS/> IM: CPT | Performed by: NURSE PRACTITIONER

## 2025-07-25 PROCEDURE — 90471 IMMUNIZATION ADMIN: CPT | Performed by: NURSE PRACTITIONER

## 2025-07-25 PROCEDURE — 3008F BODY MASS INDEX DOCD: CPT | Performed by: NURSE PRACTITIONER

## 2025-07-25 ASSESSMENT — PATIENT HEALTH QUESTIONNAIRE - PHQ9
SUM OF ALL RESPONSES TO PHQ9 QUESTIONS 1 AND 2: 0
1. LITTLE INTEREST OR PLEASURE IN DOING THINGS: NOT AT ALL
2. FEELING DOWN, DEPRESSED OR HOPELESS: NOT AT ALL

## 2025-07-25 NOTE — PATIENT INSTRUCTIONS
Thank you for seeing me today Emerald Cole Frank, it was a pleasure to see you again!    Today we did your Annual Physical Exam and discussed the following:     Tdap today    Lab work ordered today.  Please have your blood drawn in the next 1-2 weeks.  You need to be FASTING for 12 hours prior to blood draw.  You may only have water.  Please contact your insurance company to ask about the best location to get blood drawn.  We will contact you with the results of your blood work and any necessary adjustments  to your plan of care, if you do not hear from us within 3-5 days of having your blood drawn, please call the office at [phone].    For assistance with scheduling referrals or consultations, please call [phone] or [phone].    For laboratory, radiology, and other tests, please call [phone] ([phone] for pediatrics).   For laboratory locations, please visit [email or web address]   If you do not get results within 7-10 days, or you have any questions or concerns, please send a message, call the office ([phone]), or return to the office for a follow-up appointment.     For acute/sick visits, if you are unable to get an office visit, you can do a  On Demand Virtual Visit that is accessible via your My Chart account.  For emergencies, call 9-1-1 or go to the nearest Emergency Department.     Please schedule additional appointment(s) to address concern(s) not addressed today.    Please review prescription inserts and published information for possible adverse effects of all medications.     In general, results are discussed over the phone or via  [employer].     You can see your health information, review clinical summaries from office visits & test results online when you follow your health with MY  Chart, a personal health record.   To sign up go to [email or web address].   If you need assistance with signing up or trouble getting into your  account call [facility] Patient Line 24/7 at [phone]     RTC ANNUALLY AND AS NEEDED     ~Aurora Johnson NP

## 2025-07-25 NOTE — ASSESSMENT & PLAN NOTE
In a face to face session, I informed patient of his/her BMI > 30.  We discussed appropriate nutrition choices and exercise plan to help achieve weight reduction.   Aim for 60 gm of Protein daily  Drink 60 oz of Water daily  Exercise 60 min EVERYDAY     Orders:    Hemoglobin A1C; Future

## 2025-07-25 NOTE — ASSESSMENT & PLAN NOTE
- Counseled on healthy diet and regular exercise  - Fall avoidance information provided  - Personalized prevention plan provided   - Mammogram UTD  - PAP UTD  - Vaccines UTD  - FBW  ordered

## 2025-08-02 LAB — PROLACTIN SERPL-MCNC: 29.2 NG/ML

## 2025-08-03 LAB
CHOLEST SERPL-MCNC: 184 MG/DL
CHOLEST/HDLC SERPL: 4.7 (CALC)
EST. AVERAGE GLUCOSE BLD GHB EST-MCNC: 131 MG/DL
EST. AVERAGE GLUCOSE BLD GHB EST-SCNC: 7.3 MMOL/L
HBA1C MFR BLD: 6.2 %
HDLC SERPL-MCNC: 39 MG/DL
LDLC SERPL CALC-MCNC: 113 MG/DL (CALC)
NONHDLC SERPL-MCNC: 145 MG/DL (CALC)
TRIGL SERPL-MCNC: 205 MG/DL

## 2025-08-04 ENCOUNTER — RESULTS FOLLOW-UP (OUTPATIENT)
Dept: PRIMARY CARE | Facility: CLINIC | Age: 45
End: 2025-08-04
Payer: COMMERCIAL

## 2025-08-04 DIAGNOSIS — R73.03 PREDIABETES: Primary | ICD-10-CM

## 2025-08-04 NOTE — RESULT ENCOUNTER NOTE
Emerald Marie    I have reviewed all of your blood work and here are my recommendations:     Your LDLs (bad cholesterol) were higher than normal.    Please try to exercise regularly: at least 150 minutes/week  Cut back on foods high in trans fat and saturated fats, like red meats, creams, cheese, and butter  Limit sweets and salt   Our goal is to have your LDL's < 70    The American Heart Association recommends that people who have high triglycerides limit their intake of saturated fat, added sugar, and salt and increase their intake of whole grains, fruits, lean meats, legumes, fat-free or low-fat dairy, seafood, poultry, nuts, and non-starchy vegetables.      Your A1C was 6.2%---> this is Pre-diabetic  Watch sugars and carbohydrates     If you have any questions, please feel free to call my office.    Take Care,   Aurora

## 2025-08-08 ENCOUNTER — OFFICE VISIT (OUTPATIENT)
Dept: SURGERY | Facility: CLINIC | Age: 45
End: 2025-08-08
Payer: COMMERCIAL

## 2025-08-08 VITALS
SYSTOLIC BLOOD PRESSURE: 112 MMHG | HEART RATE: 73 BPM | BODY MASS INDEX: 46.98 KG/M2 | DIASTOLIC BLOOD PRESSURE: 75 MMHG | WEIGHT: 275.2 LBS | HEIGHT: 64 IN

## 2025-08-08 DIAGNOSIS — K80.20 GALLSTONES: Primary | ICD-10-CM

## 2025-08-08 PROCEDURE — 3008F BODY MASS INDEX DOCD: CPT | Performed by: SURGERY

## 2025-08-08 PROCEDURE — 99203 OFFICE O/P NEW LOW 30 MIN: CPT | Performed by: SURGERY

## 2025-08-08 PROCEDURE — 99213 OFFICE O/P EST LOW 20 MIN: CPT | Performed by: SURGERY

## 2025-08-08 ASSESSMENT — ENCOUNTER SYMPTOMS
LOSS OF SENSATION IN FEET: 0
DEPRESSION: 0
OCCASIONAL FEELINGS OF UNSTEADINESS: 0

## 2025-08-08 ASSESSMENT — PAIN SCALES - GENERAL: PAINLEVEL_OUTOF10: 2

## 2025-08-08 NOTE — LETTER
August 8, 2025     BETH Shields-CNP  [address]  [facility]– [place]note 2], [address]  [facility] [address]    Patient: Emerald Marie   YOB: 1980   Date of Visit: 8/8/2025       Dear BETH Covington-CNP:    Thank you for referring Emerald Marie to me for evaluation. Below are my notes for this consultation.  If you have questions, please do not hesitate to call me. I look forward to following your patient along with you.       Sincerely,     Konstantin Lentz MD      CC: No Recipients  ______________________________________________________________________________________    Subjective   Patient ID: Emerald Marie is a 45 y.o. female who presents for Cholelithiasis.  HPI  Patient is a 45-year-old female who was referred for evaluation of treatment of symptomatic hemorrhoids.  She has had 2 attacks of right upper quadrant abdominal pain, the first in January that resolved without difficulty after taking some ibuprofen.  She had a particularly bad attack on 7/14/2025 with symptoms to include severe right upper quadrant abdominal pain radiated to the back.  Denied nausea vomiting.  She sought treatment emergency department where right upper quadrant ultrasound shows gallstones.  At this time she is asymptomatic.    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    Patient does not smoke. Patient does occ  drink     Past Medical History:   Diagnosis Date   • Abnormal Pap smear of cervix    • Allergy status to unspecified drugs, medicaments and biological substances     History of seasonal allergies   • Anemia    • Anxiety 2022   • CPAP (continuous positive airway pressure)  dependence 2020   • Depression 2022   • Dizziness    • Encounter for gynecological examination (general) (routine) without abnormal findings     Encounter for annual routine gynecological examination   • Encounter for other general counseling and advice on contraception 03/27/2017    Counseling for birth control, oral contraceptives   • Encounter for other preprocedural examination 09/03/2020    Preoperative clearance   • Encounter for other specified surgical aftercare 02/22/2021    Postoperative observation   • Encounter for screening for nutritional disorder     Encounter for vitamin deficiency screening   • Encounter for screening for nutritional disorder 08/19/2020    Encounter for special screening examination for nutritional disorder   • Endometriosis 2020   • Fibroid 2020   • Hernia, internal 2021    Umbilical   • Hirsutism    • History of prediabetes    • Hyperlipidemia    • Infection following a procedure, superficial incisional surgical site, initial encounter 01/25/2021    Superficial incisional infection of surgical site   • Metabolic disease 2013   • Morbid obesity (Multi)    • Obesity, unspecified 09/03/2020    Obesity (BMI 30-39.9)   • DEVANTE (obstructive sleep apnea) 2020   • Other prurigo 04/23/2021    Pruritic rash   • Other specified disorders of nose and nasal sinuses 03/06/2020    Sinus pressure   • PCOS (polycystic ovarian syndrome)    • Personal history of diseases of the blood and blood-forming organs and certain disorders involving the immune mechanism     History of anemia   • Personal history of diseases of the skin and subcutaneous tissue 12/04/2018    History of pityriasis rosea   • Personal history of other benign neoplasm 10/13/2020    History of uterine leiomyoma   • Personal history of other diseases of the female genital tract 11/13/2020    History of abnormal uterine bleeding   • Personal history of other diseases of the female genital tract 02/05/2020    History of polycystic  ovarian syndrome   • Personal history of other diseases of the respiratory system 02/21/2015    History of acute sinusitis   • Personal history of other specified conditions 06/10/2020    History of dizziness   • Personal history of other specified conditions 09/04/2013    History of nipple discharge   • Persons encountering health services in other specified circumstances 07/29/2020    Encounter to establish care   • Polycystic ovarian disease 2015   • Polycystic ovary syndrome 2014   • Polyp of cervix uteri 09/08/2020    Cervical polyp   • Prolactinoma (Multi)    • Radiculopathy, lumbar region 02/23/2016    Acute lumbar radiculopathy   • Right lower quadrant pain 10/15/2019    Abdominal pain, RLQ (right lower quadrant)   • Sleep apnea    • Smoking    • Snoring     Snoring   • Urinary tract infection 2023   • Varicella    • Vestibular migraine    • Vitamin D deficiency     obesity, iron deficiency anemia, DEVANTE on CPAP, hyperlipidemia   prolactinoma and PCOS,   parotid lesion     Past Surgical History:  Procedure Laterality Date   • COLPOSCOPY     • HYSTERECTOMY  12/29/2020   • MICRODISCECTOMY LUMBAR  2015   • OTHER SURGICAL HISTORY  08/18/2021    Partial hysterectomy   • OTHER SURGICAL HISTORY  2015    Lumbar laminectomy   • OTHER SURGICAL HISTORY  10/15/2019    [place] tooth extraction   • OTHER SURGICAL HISTORY  12/22/2016    Dental Surgery            Objective    IMPRESSION:  1. Several echogenic stones are present in the gallbladder without  evidence of wall thickening, pericholecystic stranding or other signs  of acute cholecystitis. No evidence of biliary dilatation.  2. Liver is enlarged, demonstrates diffusely increased echogenicity  suggestive of fatty infiltration. Correlate with serum LFTs.      Lab Results   Component Value Date    GLUCOSE 96 07/14/2025     07/14/2025    K 4.0 07/14/2025     07/14/2025    CO2 28 07/14/2025    ANIONGAP 11 07/14/2025    BUN 14 07/14/2025    CREATININE 0.79  07/14/2025    EGFR >90 07/14/2025    CALCIUM 9.7 07/14/2025    ALBUMIN 4.4 07/14/2025    ALKPHOS 52 07/14/2025    PROT 7.6 07/14/2025    AST 16 07/14/2025    BILITOT 0.3 07/14/2025    ALT 31 07/14/2025        Physical Exam    Moderate obese, well-developed. In no distress  Alert and oriented x 3  Lungs are clear to auscultation bilaterally.  Cardiac exam is regular rhythm and rate.  Abdomen is soft nontender nondistended.  Neurologic exam is without focal deficit.     Assessment/Plan   Diagnoses and all orders for this visit:  Gallstones  -     Case Request Operating Room: CHOLECYSTECTOMY, LAPAROSCOPIC; Standing  Other orders  -     Referral to General Surgery  -     Place in outpatient/hospital ambulatory surgery; Standing  -     Full code; Standing  -     NPO Diet Except: Sips with meds; Effective now; Standing  -     Height and weight; Standing  -     Insert and maintain peripheral IV; Standing  -     Saline lock IV; Standing  -     POCT Glucose; Standing  -     POCT pregnancy, urine; Standing  -     Inpatient consult to Respiratory Care; Standing       Impression: Symptomatic cholelithiasis.  I do recommend laparoscopic cholecystectomy.  We discussed the procedure to include risk, benefits and alternatives.    She is scheduled to undergo parotidectomy in late August.  Recommend that she recovers from the surgery before her cholecystectomy.  We did give her a date for laparoscopic cholecystectomy end of September  Recommended that she avoid fatty, rich or greasy meals.

## 2025-08-08 NOTE — PROGRESS NOTES
Subjective   Patient ID: Emerald Marie is a 45 y.o. female who presents for Cholelithiasis.  HPI  Patient is a 45-year-old female who was referred for evaluation of treatment of symptomatic hemorrhoids.  She has had 2 attacks of right upper quadrant abdominal pain, the first in January that resolved without difficulty after taking some ibuprofen.  She had a particularly bad attack on 7/14/2025 with symptoms to include severe right upper quadrant abdominal pain radiated to the back.  Denied nausea vomiting.  She sought treatment emergency department where right upper quadrant ultrasound shows gallstones.  At this time she is asymptomatic.    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    Patient does not smoke. Patient does occ  drink     Past Medical History:   Diagnosis Date    Abnormal Pap smear of cervix     Allergy status to unspecified drugs, medicaments and biological substances     History of seasonal allergies    Anemia     Anxiety 2022    CPAP (continuous positive airway pressure) dependence 2020    Depression 2022    Dizziness     Encounter for gynecological examination (general) (routine) without abnormal findings     Encounter for annual routine gynecological examination    Encounter for other general counseling and advice on contraception 03/27/2017    Counseling for birth control, oral contraceptives    Encounter for other preprocedural examination 09/03/2020    Preoperative clearance    Encounter for other specified surgical aftercare 02/22/2021    Postoperative observation    Encounter for screening for nutritional disorder     Encounter for vitamin deficiency screening    Encounter for  screening for nutritional disorder 08/19/2020    Encounter for special screening examination for nutritional disorder    Endometriosis 2020    Fibroid 2020    Hernia, internal 2021    Umbilical    Hirsutism     History of prediabetes     Hyperlipidemia     Infection following a procedure, superficial incisional surgical site, initial encounter 01/25/2021    Superficial incisional infection of surgical site    Metabolic disease 2013    Morbid obesity (Multi)     Obesity, unspecified 09/03/2020    Obesity (BMI 30-39.9)    DEVANTE (obstructive sleep apnea) 2020    Other prurigo 04/23/2021    Pruritic rash    Other specified disorders of nose and nasal sinuses 03/06/2020    Sinus pressure    PCOS (polycystic ovarian syndrome)     Personal history of diseases of the blood and blood-forming organs and certain disorders involving the immune mechanism     History of anemia    Personal history of diseases of the skin and subcutaneous tissue 12/04/2018    History of pityriasis rosea    Personal history of other benign neoplasm 10/13/2020    History of uterine leiomyoma    Personal history of other diseases of the female genital tract 11/13/2020    History of abnormal uterine bleeding    Personal history of other diseases of the female genital tract 02/05/2020    History of polycystic ovarian syndrome    Personal history of other diseases of the respiratory system 02/21/2015    History of acute sinusitis    Personal history of other specified conditions 06/10/2020    History of dizziness    Personal history of other specified conditions 09/04/2013    History of nipple discharge    Persons encountering health services in other specified circumstances 07/29/2020    Encounter to establish care    Polycystic ovarian disease 2015    Polycystic ovary syndrome 2014    Polyp of cervix uteri 09/08/2020    Cervical polyp    Prolactinoma (Multi)     Radiculopathy, lumbar region 02/23/2016    Acute lumbar radiculopathy    Right lower quadrant  pain 10/15/2019    Abdominal pain, RLQ (right lower quadrant)    Sleep apnea     Smoking     Snoring     Snoring    Urinary tract infection 2023    Varicella     Vestibular migraine     Vitamin D deficiency     obesity, iron deficiency anemia, DEVANTE on CPAP, hyperlipidemia   prolactinoma and PCOS,   parotid lesion     Past Surgical History:  Procedure Laterality Date    COLPOSCOPY      HYSTERECTOMY  12/29/2020    MICRODISCECTOMY LUMBAR  2015    OTHER SURGICAL HISTORY  08/18/2021    Partial hysterectomy    OTHER SURGICAL HISTORY  2015    Lumbar laminectomy    OTHER SURGICAL HISTORY  10/15/2019    [place] tooth extraction    OTHER SURGICAL HISTORY  12/22/2016    Dental Surgery            Objective    IMPRESSION:  1. Several echogenic stones are present in the gallbladder without  evidence of wall thickening, pericholecystic stranding or other signs  of acute cholecystitis. No evidence of biliary dilatation.  2. Liver is enlarged, demonstrates diffusely increased echogenicity  suggestive of fatty infiltration. Correlate with serum LFTs.      Lab Results   Component Value Date    GLUCOSE 96 07/14/2025     07/14/2025    K 4.0 07/14/2025     07/14/2025    CO2 28 07/14/2025    ANIONGAP 11 07/14/2025    BUN 14 07/14/2025    CREATININE 0.79 07/14/2025    EGFR >90 07/14/2025    CALCIUM 9.7 07/14/2025    ALBUMIN 4.4 07/14/2025    ALKPHOS 52 07/14/2025    PROT 7.6 07/14/2025    AST 16 07/14/2025    BILITOT 0.3 07/14/2025    ALT 31 07/14/2025        Physical Exam    Moderate obese, well-developed. In no distress  Alert and oriented x 3  Lungs are clear to auscultation bilaterally.  Cardiac exam is regular rhythm and rate.  Abdomen is soft nontender nondistended.  Neurologic exam is without focal deficit.     Assessment/Plan   Diagnoses and all orders for this visit:  Gallstones  -     Case Request Operating Room: CHOLECYSTECTOMY, LAPAROSCOPIC; Standing  Other orders  -     Referral to General Surgery  -     Place in  outpatient/hospital ambulatory surgery; Standing  -     Full code; Standing  -     NPO Diet Except: Sips with meds; Effective now; Standing  -     Height and weight; Standing  -     Insert and maintain peripheral IV; Standing  -     Saline lock IV; Standing  -     POCT Glucose; Standing  -     POCT pregnancy, urine; Standing  -     Inpatient consult to Respiratory Care; Standing       Impression: Symptomatic cholelithiasis.  I do recommend laparoscopic cholecystectomy.  We discussed the procedure to include risk, benefits and alternatives.    She is scheduled to undergo parotidectomy in late August.  Recommend that she recovers from the surgery before her cholecystectomy.  We did give her a date for laparoscopic cholecystectomy end of September  Recommended that she avoid fatty, rich or greasy meals.

## 2025-08-14 ENCOUNTER — CLINICAL SUPPORT (OUTPATIENT)
Dept: PREADMISSION TESTING | Facility: HOSPITAL | Age: 45
End: 2025-08-14
Payer: COMMERCIAL

## 2025-08-14 RX ORDER — DEXTROMETHORPHAN HYDROBROMIDE, GUAIFENESIN 5; 100 MG/5ML; MG/5ML
650 LIQUID ORAL EVERY 8 HOURS PRN
COMMUNITY

## 2025-08-14 RX ORDER — CETIRIZINE HYDROCHLORIDE 5 MG/1
5 TABLET ORAL AS NEEDED
COMMUNITY

## 2025-08-14 RX ORDER — BISMUTH SUBSALICYLATE 525 MG/30ML
15 LIQUID ORAL EVERY 6 HOURS PRN
COMMUNITY

## 2025-08-14 RX ORDER — FLUTICASONE PROPIONATE 50 MCG
1 SPRAY, SUSPENSION (ML) NASAL AS NEEDED
COMMUNITY

## 2025-08-14 RX ORDER — IBUPROFEN 200 MG
400 TABLET ORAL EVERY 6 HOURS PRN
COMMUNITY

## 2025-08-22 ENCOUNTER — APPOINTMENT (OUTPATIENT)
Dept: LAB | Facility: HOSPITAL | Age: 45
End: 2025-08-22
Payer: COMMERCIAL

## 2025-08-22 ENCOUNTER — PRE-ADMISSION TESTING (OUTPATIENT)
Dept: PREADMISSION TESTING | Facility: HOSPITAL | Age: 45
End: 2025-08-22
Payer: COMMERCIAL

## 2025-08-22 VITALS
DIASTOLIC BLOOD PRESSURE: 69 MMHG | BODY MASS INDEX: 46.86 KG/M2 | RESPIRATION RATE: 18 BRPM | HEIGHT: 64 IN | TEMPERATURE: 98.2 F | OXYGEN SATURATION: 96 % | SYSTOLIC BLOOD PRESSURE: 131 MMHG | HEART RATE: 68 BPM | WEIGHT: 274.47 LBS

## 2025-08-22 DIAGNOSIS — D50.8 OTHER IRON DEFICIENCY ANEMIAS: Primary | ICD-10-CM

## 2025-08-22 DIAGNOSIS — D50.8 OTHER IRON DEFICIENCY ANEMIA: Primary | ICD-10-CM

## 2025-08-22 LAB
ABO GROUP (TYPE) IN BLOOD: NORMAL
ANTIBODY SCREEN: NORMAL
BASOPHILS # BLD AUTO: 0.07 X10*3/UL (ref 0–0.1)
BASOPHILS NFR BLD AUTO: 0.6 %
EOSINOPHIL # BLD AUTO: 0.16 X10*3/UL (ref 0–0.7)
EOSINOPHIL NFR BLD AUTO: 1.4 %
ERYTHROCYTE [DISTWIDTH] IN BLOOD BY AUTOMATED COUNT: 14 % (ref 11.5–14.5)
HCT VFR BLD AUTO: 36.4 % (ref 36–46)
HGB BLD-MCNC: 10.9 G/DL (ref 12–16)
IMM GRANULOCYTES # BLD AUTO: 0.04 X10*3/UL (ref 0–0.7)
IMM GRANULOCYTES NFR BLD AUTO: 0.4 % (ref 0–0.9)
LYMPHOCYTES # BLD AUTO: 3.19 X10*3/UL (ref 1.2–4.8)
LYMPHOCYTES NFR BLD AUTO: 28.6 %
MCH RBC QN AUTO: 25.7 PG (ref 26–34)
MCHC RBC AUTO-ENTMCNC: 29.9 G/DL (ref 32–36)
MCV RBC AUTO: 86 FL (ref 80–100)
MONOCYTES # BLD AUTO: 0.71 X10*3/UL (ref 0.1–1)
MONOCYTES NFR BLD AUTO: 6.4 %
NEUTROPHILS # BLD AUTO: 6.97 X10*3/UL (ref 1.2–7.7)
NEUTROPHILS NFR BLD AUTO: 62.6 %
NRBC BLD-RTO: 0 /100 WBCS (ref 0–0)
PLATELET # BLD AUTO: 339 X10*3/UL (ref 150–450)
RBC # BLD AUTO: 4.24 X10*6/UL (ref 4–5.2)
RH FACTOR (ANTIGEN D): NORMAL
WBC # BLD AUTO: 11.1 X10*3/UL (ref 4.4–11.3)

## 2025-08-22 PROCEDURE — 86850 RBC ANTIBODY SCREEN: CPT

## 2025-08-22 PROCEDURE — 86901 BLOOD TYPING SEROLOGIC RH(D): CPT

## 2025-08-22 PROCEDURE — 86900 BLOOD TYPING SEROLOGIC ABO: CPT

## 2025-08-22 PROCEDURE — 85025 COMPLETE CBC W/AUTO DIFF WBC: CPT

## 2025-08-22 ASSESSMENT — ENCOUNTER SYMPTOMS
ENDOCRINE NEGATIVE: 1
NEUROLOGICAL NEGATIVE: 1
NECK NEGATIVE: 1
MUSCULOSKELETAL NEGATIVE: 1
CARDIOVASCULAR NEGATIVE: 1
CONSTITUTIONAL NEGATIVE: 1
RESPIRATORY NEGATIVE: 1
GASTROINTESTINAL NEGATIVE: 1

## 2025-08-27 ENCOUNTER — ANESTHESIA EVENT (OUTPATIENT)
Dept: OPERATING ROOM | Facility: HOSPITAL | Age: 45
End: 2025-08-27
Payer: COMMERCIAL

## 2025-08-28 ENCOUNTER — HOSPITAL ENCOUNTER (OUTPATIENT)
Facility: HOSPITAL | Age: 45
LOS: 1 days | Discharge: HOME | End: 2025-08-29
Attending: OTOLARYNGOLOGY | Admitting: OTOLARYNGOLOGY
Payer: COMMERCIAL

## 2025-08-28 ENCOUNTER — ANESTHESIA (OUTPATIENT)
Dept: OPERATING ROOM | Facility: HOSPITAL | Age: 45
End: 2025-08-28
Payer: COMMERCIAL

## 2025-08-28 PROCEDURE — 2500000004 HC RX 250 GENERAL PHARMACY W/ HCPCS (ALT 636 FOR OP/ED): Performed by: REGISTERED NURSE

## 2025-08-28 RX ORDER — ONDANSETRON HYDROCHLORIDE 2 MG/ML
INJECTION, SOLUTION INTRAVENOUS AS NEEDED
Status: DISCONTINUED | OUTPATIENT
Start: 2025-08-28 | End: 2025-08-28

## 2025-08-28 RX ORDER — PROPOFOL 10 MG/ML
INJECTION, EMULSION INTRAVENOUS AS NEEDED
Status: DISCONTINUED | OUTPATIENT
Start: 2025-08-28 | End: 2025-08-28

## 2025-08-28 RX ORDER — SUCCINYLCHOLINE CHLORIDE 20 MG/ML
INJECTION INTRAMUSCULAR; INTRAVENOUS AS NEEDED
Status: DISCONTINUED | OUTPATIENT
Start: 2025-08-28 | End: 2025-08-28

## 2025-08-28 RX ORDER — ROCURONIUM BROMIDE 10 MG/ML
INJECTION, SOLUTION INTRAVENOUS AS NEEDED
Status: DISCONTINUED | OUTPATIENT
Start: 2025-08-28 | End: 2025-08-28

## 2025-08-28 RX ORDER — PHENYLEPHRINE HCL IN 0.9% NACL 1 MG/10 ML
SYRINGE (ML) INTRAVENOUS AS NEEDED
Status: DISCONTINUED | OUTPATIENT
Start: 2025-08-28 | End: 2025-08-28

## 2025-08-28 RX ORDER — LABETALOL HYDROCHLORIDE 5 MG/ML
INJECTION, SOLUTION INTRAVENOUS AS NEEDED
Status: DISCONTINUED | OUTPATIENT
Start: 2025-08-28 | End: 2025-08-28

## 2025-08-28 RX ORDER — ESMOLOL HYDROCHLORIDE 10 MG/ML
INJECTION INTRAVENOUS AS NEEDED
Status: DISCONTINUED | OUTPATIENT
Start: 2025-08-28 | End: 2025-08-28

## 2025-08-28 RX ORDER — MIDAZOLAM HYDROCHLORIDE 2 MG/2ML
INJECTION, SOLUTION INTRAMUSCULAR; INTRAVENOUS AS NEEDED
Status: DISCONTINUED | OUTPATIENT
Start: 2025-08-28 | End: 2025-08-28

## 2025-08-28 RX ORDER — CEFAZOLIN 1 G/1
INJECTION, POWDER, FOR SOLUTION INTRAVENOUS AS NEEDED
Status: DISCONTINUED | OUTPATIENT
Start: 2025-08-28 | End: 2025-08-28

## 2025-08-28 RX ORDER — HYDROMORPHONE HYDROCHLORIDE 1 MG/ML
INJECTION, SOLUTION INTRAMUSCULAR; INTRAVENOUS; SUBCUTANEOUS AS NEEDED
Status: DISCONTINUED | OUTPATIENT
Start: 2025-08-28 | End: 2025-08-28

## 2025-08-28 RX ORDER — DEXMEDETOMIDINE IN 0.9 % NACL 20 MCG/5ML
SYRINGE (ML) INTRAVENOUS AS NEEDED
Status: DISCONTINUED | OUTPATIENT
Start: 2025-08-28 | End: 2025-08-28

## 2025-08-28 RX ORDER — FENTANYL CITRATE 50 UG/ML
INJECTION, SOLUTION INTRAMUSCULAR; INTRAVENOUS AS NEEDED
Status: DISCONTINUED | OUTPATIENT
Start: 2025-08-28 | End: 2025-08-28

## 2025-08-28 RX ORDER — LIDOCAINE HYDROCHLORIDE 20 MG/ML
INJECTION, SOLUTION EPIDURAL; INFILTRATION; INTRACAUDAL; PERINEURAL AS NEEDED
Status: DISCONTINUED | OUTPATIENT
Start: 2025-08-28 | End: 2025-08-28

## 2025-08-28 RX ADMIN — PROPOFOL 40 MG: 10 INJECTION, EMULSION INTRAVENOUS at 12:11

## 2025-08-28 RX ADMIN — SUCCINYLCHOLINE CHLORIDE 120 MG: 20 INJECTION, SOLUTION INTRAMUSCULAR; INTRAVENOUS at 07:44

## 2025-08-28 RX ADMIN — SODIUM CHLORIDE, POTASSIUM CHLORIDE, SODIUM LACTATE AND CALCIUM CHLORIDE: 600; 310; 30; 20 INJECTION, SOLUTION INTRAVENOUS at 08:51

## 2025-08-28 RX ADMIN — Medication 8 MCG: at 09:09

## 2025-08-28 RX ADMIN — ROCURONIUM BROMIDE 20 MG: 10 INJECTION INTRAVENOUS at 07:45

## 2025-08-28 RX ADMIN — CEFAZOLIN 3 G: 1 INJECTION, POWDER, FOR SOLUTION INTRAMUSCULAR; INTRAVENOUS at 11:38

## 2025-08-28 RX ADMIN — LABETALOL HYDROCHLORIDE 5 MG: 5 INJECTION INTRAVENOUS at 10:18

## 2025-08-28 RX ADMIN — DEXAMETHASONE SODIUM PHOSPHATE 8 MG: 4 INJECTION, SOLUTION INTRAMUSCULAR; INTRAVENOUS at 07:55

## 2025-08-28 RX ADMIN — LIDOCAINE HYDROCHLORIDE 100 MG: 20 INJECTION, SOLUTION EPIDURAL; INFILTRATION; INTRACAUDAL; PERINEURAL at 07:43

## 2025-08-28 RX ADMIN — PROPOFOL 50 MG: 10 INJECTION, EMULSION INTRAVENOUS at 11:15

## 2025-08-28 RX ADMIN — SODIUM CHLORIDE, POTASSIUM CHLORIDE, SODIUM LACTATE AND CALCIUM CHLORIDE: 600; 310; 30; 20 INJECTION, SOLUTION INTRAVENOUS at 07:27

## 2025-08-28 RX ADMIN — FENTANYL CITRATE 25 MCG: 50 INJECTION, SOLUTION INTRAMUSCULAR; INTRAVENOUS at 08:28

## 2025-08-28 RX ADMIN — Medication 100 MCG: at 08:03

## 2025-08-28 RX ADMIN — HYDROMORPHONE HYDROCHLORIDE 0.2 MG: 1 INJECTION, SOLUTION INTRAMUSCULAR; INTRAVENOUS; SUBCUTANEOUS at 10:02

## 2025-08-28 RX ADMIN — ESMOLOL HYDROCHLORIDE 10 MG: 100 INJECTION, SOLUTION INTRAVENOUS at 09:34

## 2025-08-28 RX ADMIN — LABETALOL HYDROCHLORIDE 2.5 MG: 5 INJECTION INTRAVENOUS at 10:06

## 2025-08-28 RX ADMIN — LABETALOL HYDROCHLORIDE 5 MG: 5 INJECTION INTRAVENOUS at 12:31

## 2025-08-28 RX ADMIN — ROCURONIUM BROMIDE 10 MG: 10 INJECTION INTRAVENOUS at 07:43

## 2025-08-28 RX ADMIN — LABETALOL HYDROCHLORIDE 2.5 MG: 5 INJECTION INTRAVENOUS at 10:08

## 2025-08-28 RX ADMIN — GLYCOPYRROLATE 0.2 MG: 0.2 INJECTION, SOLUTION INTRAMUSCULAR; INTRAVENOUS at 07:27

## 2025-08-28 RX ADMIN — PROPOFOL 100 MCG/KG/MIN: 10 INJECTION, EMULSION INTRAVENOUS at 07:50

## 2025-08-28 RX ADMIN — FENTANYL CITRATE 25 MCG: 50 INJECTION, SOLUTION INTRAMUSCULAR; INTRAVENOUS at 08:48

## 2025-08-28 RX ADMIN — ESMOLOL HYDROCHLORIDE 30 MG: 100 INJECTION, SOLUTION INTRAVENOUS at 08:00

## 2025-08-28 RX ADMIN — ESMOLOL HYDROCHLORIDE 20 MG: 100 INJECTION, SOLUTION INTRAVENOUS at 11:25

## 2025-08-28 RX ADMIN — CEFAZOLIN 3 G: 1 INJECTION, POWDER, FOR SOLUTION INTRAMUSCULAR; INTRAVENOUS at 07:38

## 2025-08-28 RX ADMIN — HYDROMORPHONE HYDROCHLORIDE 0.4 MG: 1 INJECTION, SOLUTION INTRAMUSCULAR; INTRAVENOUS; SUBCUTANEOUS at 11:00

## 2025-08-28 RX ADMIN — HYDROMORPHONE HYDROCHLORIDE 0.2 MG: 1 INJECTION, SOLUTION INTRAMUSCULAR; INTRAVENOUS; SUBCUTANEOUS at 11:15

## 2025-08-28 RX ADMIN — SUGAMMADEX 250 MG: 100 INJECTION, SOLUTION INTRAVENOUS at 12:22

## 2025-08-28 RX ADMIN — MIDAZOLAM HYDROCHLORIDE 2 MG: 1 INJECTION, SOLUTION INTRAMUSCULAR; INTRAVENOUS at 07:27

## 2025-08-28 RX ADMIN — PROPOFOL 200 MG: 10 INJECTION, EMULSION INTRAVENOUS at 07:43

## 2025-08-28 RX ADMIN — HYDROMORPHONE HYDROCHLORIDE 0.2 MG: 1 INJECTION, SOLUTION INTRAMUSCULAR; INTRAVENOUS; SUBCUTANEOUS at 09:55

## 2025-08-28 RX ADMIN — ESMOLOL HYDROCHLORIDE 20 MG: 100 INJECTION, SOLUTION INTRAVENOUS at 10:59

## 2025-08-28 RX ADMIN — ONDANSETRON 4 MG: 2 INJECTION, SOLUTION INTRAMUSCULAR; INTRAVENOUS at 12:01

## 2025-08-28 RX ADMIN — DEXAMETHASONE SODIUM PHOSPHATE 4 MG: 4 INJECTION, SOLUTION INTRAMUSCULAR; INTRAVENOUS at 11:11

## 2025-08-28 RX ADMIN — Medication 12 MCG: at 08:32

## 2025-08-28 RX ADMIN — Medication 200 MCG: at 08:25

## 2025-08-28 RX ADMIN — PROPOFOL 50 MG: 10 INJECTION, EMULSION INTRAVENOUS at 11:00

## 2025-08-28 RX ADMIN — FENTANYL CITRATE 50 MCG: 50 INJECTION, SOLUTION INTRAMUSCULAR; INTRAVENOUS at 07:54

## 2025-08-28 RX ADMIN — FENTANYL CITRATE 100 MCG: 50 INJECTION, SOLUTION INTRAMUSCULAR; INTRAVENOUS at 07:43

## 2025-08-28 ASSESSMENT — PAIN DESCRIPTION - LOCATION
LOCATION: NECK
LOCATION: NECK

## 2025-08-28 ASSESSMENT — ENCOUNTER SYMPTOMS
EYES NEGATIVE: 1
RESPIRATORY NEGATIVE: 1
FACIAL ASYMMETRY: 0
GASTROINTESTINAL NEGATIVE: 1
CONSTITUTIONAL NEGATIVE: 1
CARDIOVASCULAR NEGATIVE: 1

## 2025-08-28 ASSESSMENT — PAIN SCALES - GENERAL
PAINLEVEL_OUTOF10: 3
PAINLEVEL_OUTOF10: 0 - NO PAIN
PAIN_LEVEL: 0
PAINLEVEL_OUTOF10: 8
PAINLEVEL_OUTOF10: 0 - NO PAIN
PAINLEVEL_OUTOF10: 3
PAINLEVEL_OUTOF10: 8
PAINLEVEL_OUTOF10: 0 - NO PAIN
PAINLEVEL_OUTOF10: 4
PAINLEVEL_OUTOF10: 7
PAINLEVEL_OUTOF10: 3

## 2025-08-28 ASSESSMENT — PAIN - FUNCTIONAL ASSESSMENT
PAIN_FUNCTIONAL_ASSESSMENT: 0-10

## 2025-08-28 ASSESSMENT — COGNITIVE AND FUNCTIONAL STATUS - GENERAL
MOBILITY SCORE: 24
DAILY ACTIVITIY SCORE: 24

## 2025-08-28 ASSESSMENT — PAIN DESCRIPTION - DESCRIPTORS
DESCRIPTORS: ACHING

## 2025-08-28 ASSESSMENT — PAIN DESCRIPTION - ORIENTATION
ORIENTATION: RIGHT
ORIENTATION: RIGHT

## 2025-08-29 ENCOUNTER — PHARMACY VISIT (OUTPATIENT)
Dept: PHARMACY | Facility: CLINIC | Age: 45
End: 2025-08-29
Payer: COMMERCIAL

## 2025-08-29 PROCEDURE — RXMED WILLOW AMBULATORY MEDICATION CHARGE

## 2025-08-29 SDOH — ECONOMIC STABILITY: FOOD INSECURITY: WITHIN THE PAST 12 MONTHS, THE FOOD YOU BOUGHT JUST DIDN'T LAST AND YOU DIDN'T HAVE MONEY TO GET MORE.: NEVER TRUE

## 2025-08-29 SDOH — ECONOMIC STABILITY: INCOME INSECURITY: IN THE LAST 12 MONTHS, WAS THERE A TIME WHEN YOU WERE NOT ABLE TO PAY THE MORTGAGE OR RENT ON TIME?: YES

## 2025-08-29 SDOH — ECONOMIC STABILITY: FOOD INSECURITY: WITHIN THE PAST 12 MONTHS, YOU WORRIED THAT YOUR FOOD WOULD RUN OUT BEFORE YOU GOT MONEY TO BUY MORE.: NEVER TRUE

## 2025-08-29 SDOH — ECONOMIC STABILITY: FOOD INSECURITY: WITHIN THE PAST 12 MONTHS, YOU WORRIED THAT YOUR FOOD WOULD RUN OUT BEFORE YOU GOT THE MONEY TO BUY MORE.: NEVER TRUE

## 2025-08-29 SDOH — ECONOMIC STABILITY: FOOD INSECURITY

## 2025-08-29 SDOH — ECONOMIC STABILITY: TRANSPORTATION INSECURITY
IN THE PAST 12 MONTHS, HAS THE LACK OF TRANSPORTATION KEPT YOU FROM MEDICAL APPOINTMENTS OR FROM GETTING MEDICATIONS?: NO

## 2025-08-29 SDOH — ECONOMIC STABILITY: TRANSPORTATION INSECURITY: IN THE PAST 12 MONTHS, HAS LACK OF TRANSPORTATION KEPT YOU FROM MEDICAL APPOINTMENTS OR FROM GETTING MEDICATIONS?: NO

## 2025-08-29 SDOH — ECONOMIC STABILITY: HOUSING INSECURITY
IN THE LAST 12 MONTHS, WAS THERE A TIME WHEN YOU DID NOT HAVE A STEADY PLACE TO SLEEP OR SLEPT IN A SHELTER (INCLUDING NOW)?: NO

## 2025-08-29 SDOH — ECONOMIC STABILITY: HOUSING INSECURITY: IN THE LAST 12 MONTHS, HOW MANY PLACES HAVE YOU LIVED?: 1

## 2025-08-29 SDOH — ECONOMIC STABILITY: GENERAL

## 2025-08-29 SDOH — ECONOMIC STABILITY: TRANSPORTATION INSECURITY

## 2025-08-29 SDOH — ECONOMIC STABILITY: HOUSING INSECURITY: IN THE LAST 12 MONTHS, WAS THERE A TIME WHEN YOU WERE NOT ABLE TO PAY THE MORTGAGE OR RENT ON TIME?: YES

## 2025-08-29 SDOH — ECONOMIC STABILITY: HOUSING INSECURITY: IN THE PAST 12 MONTHS HAS THE ELECTRIC, GAS, OIL, OR WATER COMPANY THREATENED TO SHUT OFF SERVICES IN YOUR HOME?: NO

## 2025-08-29 SDOH — ECONOMIC STABILITY: TRANSPORTATION INSECURITY
IN THE PAST 12 MONTHS, HAS LACK OF TRANSPORTATION KEPT YOU FROM MEETINGS, WORK, OR FROM GETTING THINGS NEEDED FOR DAILY LIVING?: NO

## 2025-08-29 SDOH — ECONOMIC STABILITY: HOUSING INSECURITY

## 2025-08-29 ASSESSMENT — COGNITIVE AND FUNCTIONAL STATUS - GENERAL
MOBILITY SCORE: 24
DAILY ACTIVITIY SCORE: 24

## 2025-08-29 ASSESSMENT — PAIN DESCRIPTION - LOCATION: LOCATION: NECK

## 2025-08-29 ASSESSMENT — PAIN SCALES - GENERAL
PAINLEVEL_OUTOF10: 7
PAINLEVEL_OUTOF10: 3
PAINLEVEL_OUTOF10: 3
PAINLEVEL_OUTOF10: 5 - MODERATE PAIN

## 2025-08-29 ASSESSMENT — PAIN DESCRIPTION - DESCRIPTORS
DESCRIPTORS: SORE
DESCRIPTORS: SORE
DESCRIPTORS: ACHING

## 2025-08-29 ASSESSMENT — PAIN - FUNCTIONAL ASSESSMENT
PAIN_FUNCTIONAL_ASSESSMENT: 0-10

## 2025-08-29 ASSESSMENT — PAIN DESCRIPTION - ORIENTATION: ORIENTATION: RIGHT

## 2025-08-29 ASSESSMENT — ACTIVITIES OF DAILY LIVING (ADL): LACK_OF_TRANSPORTATION: NO

## 2025-08-29 ASSESSMENT — SOCIAL DETERMINANTS OF HEALTH (SDOH): IN THE PAST 12 MONTHS, HAS THE ELECTRIC, GAS, OIL, OR WATER COMPANY THREATENED TO SHUT OFF SERVICE IN YOUR HOME?: NO

## 2025-08-31 PROCEDURE — RXMED WILLOW AMBULATORY MEDICATION CHARGE

## 2025-09-01 ENCOUNTER — PATIENT MESSAGE (OUTPATIENT)
Dept: OTOLARYNGOLOGY | Facility: CLINIC | Age: 45
End: 2025-09-01
Payer: COMMERCIAL

## 2025-09-02 ENCOUNTER — PHARMACY VISIT (OUTPATIENT)
Dept: PHARMACY | Facility: CLINIC | Age: 45
End: 2025-09-02
Payer: COMMERCIAL

## 2025-09-02 PROCEDURE — RXMED WILLOW AMBULATORY MEDICATION CHARGE

## 2025-09-05 ENCOUNTER — APPOINTMENT (OUTPATIENT)
Dept: OTOLARYNGOLOGY | Facility: CLINIC | Age: 45
End: 2025-09-05
Payer: COMMERCIAL

## 2025-10-31 ENCOUNTER — APPOINTMENT (OUTPATIENT)
Dept: OTOLARYNGOLOGY | Facility: CLINIC | Age: 45
End: 2025-10-31
Payer: COMMERCIAL

## 2026-03-10 ENCOUNTER — APPOINTMENT (OUTPATIENT)
Dept: ENDOCRINOLOGY | Facility: CLINIC | Age: 46
End: 2026-03-10
Payer: COMMERCIAL

## 2026-05-05 ENCOUNTER — APPOINTMENT (OUTPATIENT)
Dept: OPHTHALMOLOGY | Facility: CLINIC | Age: 46
End: 2026-05-05
Payer: COMMERCIAL

## 2026-07-24 ENCOUNTER — APPOINTMENT (OUTPATIENT)
Dept: PRIMARY CARE | Facility: CLINIC | Age: 46
End: 2026-07-24
Payer: COMMERCIAL